# Patient Record
Sex: MALE | Employment: OTHER | URBAN - METROPOLITAN AREA
[De-identification: names, ages, dates, MRNs, and addresses within clinical notes are randomized per-mention and may not be internally consistent; named-entity substitution may affect disease eponyms.]

---

## 2020-09-17 ENCOUNTER — ANESTHESIA EVENT (OUTPATIENT)
Dept: PERIOP | Facility: HOSPITAL | Age: 85
End: 2020-09-17
Payer: COMMERCIAL

## 2020-09-17 RX ORDER — ASCORBIC ACID 500 MG
500 TABLET ORAL DAILY
COMMUNITY

## 2020-09-18 ENCOUNTER — HOSPITAL ENCOUNTER (OUTPATIENT)
Facility: HOSPITAL | Age: 85
Setting detail: OUTPATIENT SURGERY
Discharge: HOME | End: 2020-09-19
Attending: ORTHOPAEDIC SURGERY | Admitting: ORTHOPAEDIC SURGERY
Payer: COMMERCIAL

## 2020-09-18 ENCOUNTER — ANESTHESIA (OUTPATIENT)
Dept: PERIOP | Facility: HOSPITAL | Age: 85
End: 2020-09-18
Payer: COMMERCIAL

## 2020-09-18 ENCOUNTER — HOSPITAL ENCOUNTER (OUTPATIENT)
Dept: RADIOLOGY | Facility: HOSPITAL | Age: 85
Setting detail: OUTPATIENT SURGERY
Discharge: HOME | End: 2020-09-18
Payer: COMMERCIAL

## 2020-09-18 ENCOUNTER — APPOINTMENT (OUTPATIENT)
Dept: RADIOLOGY | Facility: HOSPITAL | Age: 85
End: 2020-09-18
Payer: COMMERCIAL

## 2020-09-18 VITALS — HEART RATE: 86 BPM

## 2020-09-18 DIAGNOSIS — S42.321A CLOSED DISPLACED TRANSVERSE FRACTURE OF SHAFT OF RIGHT HUMERUS, INITIAL ENCOUNTER: Primary | ICD-10-CM

## 2020-09-18 DIAGNOSIS — S42.221A 2-PART DISPLACED FRACTURE OF SURGICAL NECK OF RIGHT HUMERUS, INITIAL ENCOUNTER FOR CLOSED FRACTURE: ICD-10-CM

## 2020-09-18 LAB
GLUCOSE SERPL-MCNC: 192 MG/DL (ref 65–140)
GLUCOSE SERPL-MCNC: 245 MG/DL (ref 65–140)
GLUCOSE SERPL-MCNC: 276 MG/DL (ref 65–140)
GLUCOSE SERPL-MCNC: 292 MG/DL (ref 65–140)

## 2020-09-18 PROCEDURE — C1713 ANCHOR/SCREW BN/BN,TIS/BN: HCPCS | Performed by: ORTHOPAEDIC SURGERY

## 2020-09-18 PROCEDURE — 82948 REAGENT STRIP/BLOOD GLUCOSE: CPT

## 2020-09-18 PROCEDURE — 73030 X-RAY EXAM OF SHOULDER: CPT

## 2020-09-18 PROCEDURE — 76000 FLUOROSCOPY <1 HR PHYS/QHP: CPT

## 2020-09-18 DEVICE — 3.5MM LOCKING SCREW SLF-TPNG W/STARDRIVE(TM) RECESS 34MM
Type: IMPLANTABLE DEVICE | Site: HUMERUS | Status: NON-FUNCTIONAL
Removed: 2020-10-16

## 2020-09-18 DEVICE — 3.5MM LOCKING SCREW SLF-TPNG W/STARDRIVE(TM) RECESS 50MM
Type: IMPLANTABLE DEVICE | Site: HUMERUS | Status: NON-FUNCTIONAL
Removed: 2020-10-16

## 2020-09-18 DEVICE — 3.5MM CORTEX SCREW SELF-TAPPING 36MM
Type: IMPLANTABLE DEVICE | Site: HUMERUS | Status: NON-FUNCTIONAL
Removed: 2020-10-16

## 2020-09-18 DEVICE — 3.5MM LOCKING SCREW SLF-TPNG WITH STARDRIVE RECESS 44MM
Type: IMPLANTABLE DEVICE | Site: HUMERUS | Status: NON-FUNCTIONAL
Removed: 2020-10-16

## 2020-09-18 DEVICE — 3.5MM LOCKING SCREW SLF-TPNG W/STARDRIVE RECESS 48MM
Type: IMPLANTABLE DEVICE | Site: HUMERUS | Status: NON-FUNCTIONAL
Removed: 2020-10-16

## 2020-09-18 DEVICE — GRAFT BONE PUTTY 10ML: Type: IMPLANTABLE DEVICE | Site: HUMERUS | Status: FUNCTIONAL

## 2020-09-18 DEVICE — 3.5MM LOCKING SCREW SLF-TPNG W/STARDRIVE(TM) RECESS 36MM
Type: IMPLANTABLE DEVICE | Site: HUMERUS | Status: NON-FUNCTIONAL
Removed: 2020-10-16

## 2020-09-18 DEVICE — 3.5MM LCP® PROXIMAL HUMERUS PLATE-STANDARD 5H SHAFT/114MM
Type: IMPLANTABLE DEVICE | Site: HUMERUS | Status: NON-FUNCTIONAL
Brand: LCP
Removed: 2020-10-16

## 2020-09-18 RX ORDER — CEFAZOLIN SODIUM 1 G/50ML
1000 SOLUTION INTRAVENOUS ONCE
Status: COMPLETED | OUTPATIENT
Start: 2020-09-18 | End: 2020-09-18

## 2020-09-18 RX ORDER — ASPIRIN 325 MG
325 TABLET ORAL DAILY
Status: DISCONTINUED | OUTPATIENT
Start: 2020-09-18 | End: 2020-09-19 | Stop reason: HOSPADM

## 2020-09-18 RX ORDER — MAGNESIUM HYDROXIDE 1200 MG/15ML
LIQUID ORAL AS NEEDED
Status: DISCONTINUED | OUTPATIENT
Start: 2020-09-18 | End: 2020-09-18 | Stop reason: HOSPADM

## 2020-09-18 RX ORDER — FENTANYL CITRATE 50 UG/ML
INJECTION, SOLUTION INTRAMUSCULAR; INTRAVENOUS AS NEEDED
Status: DISCONTINUED | OUTPATIENT
Start: 2020-09-18 | End: 2020-09-18

## 2020-09-18 RX ORDER — ONDANSETRON 2 MG/ML
INJECTION INTRAMUSCULAR; INTRAVENOUS AS NEEDED
Status: DISCONTINUED | OUTPATIENT
Start: 2020-09-18 | End: 2020-09-18

## 2020-09-18 RX ORDER — BUPIVACAINE HYDROCHLORIDE 5 MG/ML
INJECTION, SOLUTION PERINEURAL
Status: COMPLETED | OUTPATIENT
Start: 2020-09-18 | End: 2020-09-18

## 2020-09-18 RX ORDER — DEXAMETHASONE SODIUM PHOSPHATE 4 MG/ML
INJECTION, SOLUTION INTRA-ARTICULAR; INTRALESIONAL; INTRAMUSCULAR; INTRAVENOUS; SOFT TISSUE AS NEEDED
Status: DISCONTINUED | OUTPATIENT
Start: 2020-09-18 | End: 2020-09-18

## 2020-09-18 RX ORDER — ACETAMINOPHEN 325 MG/1
650 TABLET ORAL EVERY 6 HOURS PRN
Status: DISCONTINUED | OUTPATIENT
Start: 2020-09-18 | End: 2020-09-19 | Stop reason: HOSPADM

## 2020-09-18 RX ORDER — CALCIUM CARBONATE 200(500)MG
1000 TABLET,CHEWABLE ORAL DAILY PRN
Status: DISCONTINUED | OUTPATIENT
Start: 2020-09-18 | End: 2020-09-19 | Stop reason: HOSPADM

## 2020-09-18 RX ORDER — INSULIN GLARGINE 100 [IU]/ML
4 INJECTION, SOLUTION SUBCUTANEOUS EVERY 12 HOURS SCHEDULED
Status: DISCONTINUED | OUTPATIENT
Start: 2020-09-18 | End: 2020-09-19 | Stop reason: HOSPADM

## 2020-09-18 RX ORDER — PROPOFOL 10 MG/ML
INJECTION, EMULSION INTRAVENOUS AS NEEDED
Status: DISCONTINUED | OUTPATIENT
Start: 2020-09-18 | End: 2020-09-18

## 2020-09-18 RX ORDER — DOCUSATE SODIUM 100 MG/1
100 CAPSULE, LIQUID FILLED ORAL 2 TIMES DAILY
Status: DISCONTINUED | OUTPATIENT
Start: 2020-09-18 | End: 2020-09-19 | Stop reason: HOSPADM

## 2020-09-18 RX ORDER — SODIUM CHLORIDE 9 MG/ML
125 INJECTION, SOLUTION INTRAVENOUS CONTINUOUS
Status: DISCONTINUED | OUTPATIENT
Start: 2020-09-18 | End: 2020-09-18

## 2020-09-18 RX ORDER — SENNOSIDES 8.6 MG
1 TABLET ORAL DAILY
Status: DISCONTINUED | OUTPATIENT
Start: 2020-09-18 | End: 2020-09-19 | Stop reason: HOSPADM

## 2020-09-18 RX ORDER — SODIUM CHLORIDE 9 MG/ML
75 INJECTION, SOLUTION INTRAVENOUS CONTINUOUS
Status: DISCONTINUED | OUTPATIENT
Start: 2020-09-18 | End: 2020-09-19 | Stop reason: HOSPADM

## 2020-09-18 RX ORDER — GLYCOPYRROLATE 0.2 MG/ML
INJECTION INTRAMUSCULAR; INTRAVENOUS AS NEEDED
Status: DISCONTINUED | OUTPATIENT
Start: 2020-09-18 | End: 2020-09-18

## 2020-09-18 RX ORDER — NEOSTIGMINE METHYLSULFATE 1 MG/ML
INJECTION INTRAVENOUS AS NEEDED
Status: DISCONTINUED | OUTPATIENT
Start: 2020-09-18 | End: 2020-09-18

## 2020-09-18 RX ORDER — EPHEDRINE SULFATE 50 MG/ML
INJECTION INTRAVENOUS AS NEEDED
Status: DISCONTINUED | OUTPATIENT
Start: 2020-09-18 | End: 2020-09-18

## 2020-09-18 RX ORDER — ONDANSETRON 2 MG/ML
4 INJECTION INTRAMUSCULAR; INTRAVENOUS EVERY 6 HOURS PRN
Status: DISCONTINUED | OUTPATIENT
Start: 2020-09-18 | End: 2020-09-19 | Stop reason: HOSPADM

## 2020-09-18 RX ORDER — MIDAZOLAM HYDROCHLORIDE 2 MG/2ML
INJECTION, SOLUTION INTRAMUSCULAR; INTRAVENOUS AS NEEDED
Status: DISCONTINUED | OUTPATIENT
Start: 2020-09-18 | End: 2020-09-18

## 2020-09-18 RX ORDER — ROCURONIUM BROMIDE 10 MG/ML
INJECTION, SOLUTION INTRAVENOUS AS NEEDED
Status: DISCONTINUED | OUTPATIENT
Start: 2020-09-18 | End: 2020-09-18

## 2020-09-18 RX ORDER — LIDOCAINE HYDROCHLORIDE 20 MG/ML
INJECTION, SOLUTION EPIDURAL; INFILTRATION; INTRACAUDAL; PERINEURAL AS NEEDED
Status: DISCONTINUED | OUTPATIENT
Start: 2020-09-18 | End: 2020-09-18

## 2020-09-18 RX ORDER — FENTANYL CITRATE/PF 50 MCG/ML
25 SYRINGE (ML) INJECTION
Status: DISCONTINUED | OUTPATIENT
Start: 2020-09-18 | End: 2020-09-18 | Stop reason: HOSPADM

## 2020-09-18 RX ORDER — OXYCODONE HYDROCHLORIDE 5 MG/1
2.5 TABLET ORAL EVERY 4 HOURS PRN
Status: DISCONTINUED | OUTPATIENT
Start: 2020-09-18 | End: 2020-09-19 | Stop reason: HOSPADM

## 2020-09-18 RX ORDER — CEFAZOLIN SODIUM 1 G/50ML
1000 SOLUTION INTRAVENOUS EVERY 8 HOURS
Status: COMPLETED | OUTPATIENT
Start: 2020-09-18 | End: 2020-09-19

## 2020-09-18 RX ORDER — ONDANSETRON 2 MG/ML
4 INJECTION INTRAMUSCULAR; INTRAVENOUS ONCE AS NEEDED
Status: DISCONTINUED | OUTPATIENT
Start: 2020-09-18 | End: 2020-09-18 | Stop reason: HOSPADM

## 2020-09-18 RX ADMIN — NEOSTIGMINE METHYLSULFATE 3 MG: 1 INJECTION, SOLUTION INTRAVENOUS at 11:05

## 2020-09-18 RX ADMIN — INSULIN GLARGINE 4 UNITS: 100 INJECTION, SOLUTION SUBCUTANEOUS at 15:56

## 2020-09-18 RX ADMIN — EPHEDRINE SULFATE 10 MG: 50 INJECTION, SOLUTION INTRAVENOUS at 09:47

## 2020-09-18 RX ADMIN — ACETAMINOPHEN 650 MG: 325 TABLET ORAL at 12:59

## 2020-09-18 RX ADMIN — DOCUSATE SODIUM 100 MG: 100 CAPSULE, LIQUID FILLED ORAL at 18:22

## 2020-09-18 RX ADMIN — EPHEDRINE SULFATE 10 MG: 50 INJECTION, SOLUTION INTRAVENOUS at 09:45

## 2020-09-18 RX ADMIN — SODIUM CHLORIDE: 0.9 INJECTION, SOLUTION INTRAVENOUS at 11:04

## 2020-09-18 RX ADMIN — FENTANYL CITRATE 100 MCG: 50 INJECTION, SOLUTION INTRAMUSCULAR; INTRAVENOUS at 09:22

## 2020-09-18 RX ADMIN — GLYCOPYRROLATE 0.6 MG: 0.2 INJECTION, SOLUTION INTRAMUSCULAR; INTRAVENOUS at 11:05

## 2020-09-18 RX ADMIN — EPHEDRINE SULFATE 10 MG: 50 INJECTION, SOLUTION INTRAVENOUS at 09:51

## 2020-09-18 RX ADMIN — DEXAMETHASONE SODIUM PHOSPHATE 4 MG: 4 INJECTION, SOLUTION INTRAMUSCULAR; INTRAVENOUS at 09:46

## 2020-09-18 RX ADMIN — LIDOCAINE HYDROCHLORIDE 100 MG: 20 INJECTION, SOLUTION EPIDURAL; INFILTRATION; INTRACAUDAL; PERINEURAL at 09:34

## 2020-09-18 RX ADMIN — ASPIRIN 325 MG ORAL TABLET 325 MG: 325 PILL ORAL at 18:22

## 2020-09-18 RX ADMIN — SODIUM CHLORIDE: 0.9 INJECTION, SOLUTION INTRAVENOUS at 10:19

## 2020-09-18 RX ADMIN — CEFAZOLIN SODIUM 1000 MG: 1 SOLUTION INTRAVENOUS at 18:22

## 2020-09-18 RX ADMIN — INSULIN LISPRO 5 UNITS: 100 INJECTION, SOLUTION INTRAVENOUS; SUBCUTANEOUS at 18:48

## 2020-09-18 RX ADMIN — MIDAZOLAM 2 MG: 1 INJECTION INTRAMUSCULAR; INTRAVENOUS at 09:22

## 2020-09-18 RX ADMIN — ONDANSETRON 4 MG: 2 INJECTION INTRAMUSCULAR; INTRAVENOUS at 16:28

## 2020-09-18 RX ADMIN — SODIUM CHLORIDE 125 ML/HR: 0.9 INJECTION, SOLUTION INTRAVENOUS at 08:46

## 2020-09-18 RX ADMIN — BUPIVACAINE HYDROCHLORIDE 20 ML: 5 INJECTION, SOLUTION PERINEURAL at 09:25

## 2020-09-18 RX ADMIN — INSULIN LISPRO 4 UNITS: 100 INJECTION, SOLUTION INTRAVENOUS; SUBCUTANEOUS at 14:28

## 2020-09-18 RX ADMIN — INSULIN GLARGINE 4 UNITS: 100 INJECTION, SOLUTION SUBCUTANEOUS at 21:23

## 2020-09-18 RX ADMIN — ROCURONIUM BROMIDE 50 MG: 10 INJECTION, SOLUTION INTRAVENOUS at 09:34

## 2020-09-18 RX ADMIN — SENNOSIDES 8.6 MG: 8.6 TABLET, FILM COATED ORAL at 12:59

## 2020-09-18 RX ADMIN — SODIUM CHLORIDE 75 ML/HR: 0.9 INJECTION, SOLUTION INTRAVENOUS at 12:27

## 2020-09-18 RX ADMIN — ONDANSETRON 4 MG: 2 INJECTION INTRAMUSCULAR; INTRAVENOUS at 09:46

## 2020-09-18 RX ADMIN — DOCUSATE SODIUM 100 MG: 100 CAPSULE, LIQUID FILLED ORAL at 13:00

## 2020-09-18 RX ADMIN — CEFAZOLIN SODIUM 1000 MG: 1 SOLUTION INTRAVENOUS at 09:15

## 2020-09-18 RX ADMIN — PROPOFOL 200 MG: 10 INJECTION, EMULSION INTRAVENOUS at 09:34

## 2020-09-18 RX ADMIN — FENTANYL CITRATE 100 MCG: 50 INJECTION, SOLUTION INTRAMUSCULAR; INTRAVENOUS at 09:34

## 2020-09-18 NOTE — DISCHARGE INSTRUCTIONS
[facility] Operative Instructions  MARYCARMEN Alfaro  Office Phone: [phone] (All Prescribed Medications Were E-Scribed to pharmacy on file)  ¨ Blood Thinners  ¨Yes Enteric Coated Aspirin 325 mg: Take twice daily for 21 days  ¨No Plavix: Patients on Plavix will resume their standard dose while in the hospital    ¨No Xarelto: Take once daily with dinner for 2 weeks  ¨No Coumadin: If we have started you on this for blood thinning, you will be discharged from the hospital with instructions how much coumadin to take daily until your next blood draw  You will be tested on Mondays and Thursdays  The home health nurse will draw your blood  Our office will select the dosing instructions for you until your next blood draw  If you were on the medication before the surgery, the physician who manages your Coumadin will resume the care of that medication  On the day you have your blood test drawn, do not take your Coumadin dose until contacted by the office  If you have not heard by 4 P  M , please take the dose you took the day before and call the managing doctor's office for instructions in the morning  ¨ Muscle Relaxer:   ¨No Methocarbamol 750 mg: Take 1 tablet every 8 hours as needed  ¨No Cyclobenzaprine 5 mg: Take 1 tablet every 8 hours as needed  ¨ Pain Medications:   ¨No Dilaudid 2 mg: Take 1 tablet every 4 hours as needed for pain for the first 3 days  Once completed start Oxycodone 5 mg  ¨Yes Oxycodone 5 mg: Take 1 tablets every 4 hours as needed for pain  Please follow the direction on the label  Do not drive while taking pain medication  Do not take pain medication on an empty stomach  This may make you nauseated  Your prescriptions may run out before you return for your next visit  Please give us two regular office day's notice before you run out, to prevent any problems of not having pain medicine  Please refrain from using alcohol with your pain medicines   You may also include Tylenol for pain  You should not exceed 3000 mg of Tylenol in a day  Please be careful to not overdose the Tylenol  ¨ Arthritis/Anti-inflammatory: If you were taking an anti inflammatory medicine prior to your surgery, please wait until you have stopped the blood thinning medicine to resume taking it  ¨ Senokot: This is our recommended stool softener  Please use this medication to help prevent constipation that can arise from iron and pain medication use  It is also advisable to increase your fluid intake and fiber in your diet during your stoney and pain medication therapy  ¨ Herbal Medicines: Please hold all these preparations until after your first post-operative visit  ACTIVITY  ¨ Your sling should be worn at all times except during: Hygiene activities (showering and getting dressed) and Exercises activities  ¨ Continue to wear sling until you are seen by your physician  ¨ If at all possible, have someone with you to help you at all times  ¨ You may participate in activity as tolerated  It is likely that you will tire more easily for a time after surgery  Please rest when you need it to help your healing process  ¨ When you are back at home you will likely have physical therapy three times a week  On the days you do not have formal therapy please perform the home exercises you have learned or were given to you  ¨ Immediately following the surgery you are not permitted to drive until cleared by your surgeon  ¨ Please do not perform lifting tasks or strenuous domestic activities  ¨ If you currently are employed, you are off work until cleared by your surgeon  Everyone's ability to return to work is determined by his or her abilities  Your return to work may take a few weeks to a few months  ¨ Sexual activities are permitted as tolerated  A precautionary guide has been given to you before surgery  If you lost it we will gladly supply you with another      NORMAL FINDINGS   Numbness along the incision    Swelling and black and blues throughout the operative arm   Your incision area will feel warm  WOUND CARE  ¨ If you have a clear plastic (Tegaderm) dressing, you should maintain it for 5-7 days post operatively then remove it, or it will be removed by the home nurses  It is ok to leave it on until follow up with your physician  ¨ It is not uncommon for some reddish fluid to accumulate under the tegaderm   This is no cause for alarm  ¨ It is OK to shower with the tegaderm dressing on  Please do not soak or submerge the incision into a pool, bath or hot tub until after your 1st post-operative visit  ¨ If you have narrow white tape strips over the incision (Steri-Strips), Do not remove them, allow them to fall off  ¨ It is OK to shower with Steri Strips  Showers should be quick, Hand wash with soap, pat dry with a towel  Do not scrub, soak or submerge incision  ¨ You may leave the incision open to the air after removing your dressing  ¨ If there is a Mesh Dressing over the incision (Prineo), you should maintain it for 10-14 days post operatively then remove it, or it will be removed by the home nurses  It is ok to leave it on until follow up with your physician if that visit is within 14 days post operatively  ¨ Please be generous with the use of ice  It is a very good remedy  Apply ice for only twenty minutes at a time  You may use it every hour  It is recommended to ice before and after anticipated activities, which includes exercise and physical therapy  FOLLOW UP  ¨ You should have a scheduled visit with your surgeon scheduled for two to four weeks after surgery  If you do not remember your appointment date and time, or if you are sure you do not have one, please call to set one up  If you have any questions or concerns before then, do not hesitate to call    ¨ Please inform the office if you experience one of the following:   Fever that is not responding to Tylenol and is above 101 degrees    Redness of the skin that is increasing in area    Your incision is soaking the dressings with drainage or blood    You're unable to bear weight on your operative leg or legs

## 2020-09-18 NOTE — PLAN OF CARE
Problem: Potential for Falls  Goal: Patient will remain free of falls  Description: INTERVENTIONS:  - Assess patient frequently for physical needs  -  Identify cognitive and physical deficits and behaviors that affect risk of falls  -  [place] fall precautions as indicated by assessment   - Educate patient/family on patient safety including physical limitations  - Instruct patient to call for assistance with activity based on assessment  - Modify environment to reduce risk of injury  - Consider OT/PT consult to assist with strengthening/mobility  Outcome: Progressing     Problem: PAIN - ADULT  Goal: Verbalizes/displays adequate comfort level or baseline comfort level  Description: Interventions:  - Encourage patient to monitor pain and request assistance  - Assess pain using appropriate pain scale  - Administer analgesics based on type and severity of pain and evaluate response  - Implement non-pharmacological measures as appropriate and evaluate response  - Consider cultural and social influences on pain and pain management  - Notify physician/advanced practitioner if interventions unsuccessful or patient reports new pain  Outcome: Progressing     Problem: SAFETY ADULT  Goal: Patient will remain free of falls  Description: INTERVENTIONS:  - Assess patient frequently for physical needs  -  Identify cognitive and physical deficits and behaviors that affect risk of falls    -  [place] fall precautions as indicated by assessment   - Educate patient/family on patient safety including physical limitations  - Instruct patient to call for assistance with activity based on assessment  - Modify environment to reduce risk of injury  - Consider OT/PT consult to assist with strengthening/mobility  Outcome: Progressing  Goal: Maintain or return to baseline ADL function  Description: INTERVENTIONS:  -  Assess patient's ability to carry out ADLs; assess patient's baseline for ADL function and identify physical deficits which impact ability to perform ADLs (bathing, care of mouth/teeth, toileting, grooming, dressing, etc )  - Assess/evaluate cause of self-care deficits   - Assess range of motion  - Assess patient's mobility; develop plan if impaired  - Assess patient's need for assistive devices and provide as appropriate  - Encourage maximum independence but intervene and supervise when necessary  - Involve family in performance of ADLs  - Assess for home care needs following discharge   - Consider OT consult to assist with ADL evaluation and planning for discharge  - Provide patient education as appropriate  Outcome: Progressing  Goal: Maintain or return mobility status to optimal level  Description: INTERVENTIONS:  - Assess patient's baseline mobility status (ambulation, transfers, stairs, etc )    - Identify cognitive and physical deficits and behaviors that affect mobility  - Identify mobility aids required to assist with transfers and/or ambulation (gait belt, sit-to-stand, lift, walker, cane, etc )  - [place] fall precautions as indicated by assessment  - Record patient progress and toleration of activity level on Mobility SBAR; progress patient to next Phase/Stage  - Instruct patient to call for assistance with activity based on assessment  - Consider rehabilitation consult to assist with strengthening/weightbearing, etc   Outcome: Progressing     Problem: DISCHARGE PLANNING  Goal: Discharge to home or other facility with appropriate resources  Description: INTERVENTIONS:  - Identify barriers to discharge w/patient and caregiver  - Arrange for needed discharge resources and transportation as appropriate  - Identify discharge learning needs (meds, wound care, etc )  - Arrange for interpretive services to assist at discharge as needed  - Refer to Case Management Department for coordinating discharge planning if the patient needs post-hospital services based on physician/advanced practitioner order or complex needs related to functional status, cognitive ability, or social support system  Outcome: Progressing     Problem: Knowledge Deficit  Goal: Patient/family/caregiver demonstrates understanding of disease process, treatment plan, medications, and discharge instructions  Description: Complete learning assessment and assess knowledge base    Interventions:  - Provide teaching at level of understanding  - Provide teaching via preferred learning methods  Outcome: Progressing

## 2020-09-18 NOTE — ANESTHESIA PREPROCEDURE EVALUATION
Procedure:  O R I F  HUMERUS W/BONE GRAFTING (Right Arm Upper)    Relevant Problems   CARDIO   (+) LBBB (left bundle branch block)      Other   (+) Diabetes mellitus (HCC)   (+) Fracture of right humerus        Physical Exam    Airway    Mallampati score: II  TM Distance: >3 FB  Neck ROM: full     Dental   lower dentures,     Cardiovascular  Rhythm: regular, Rate: normal, Cardiovascular exam normal    Pulmonary  Pulmonary exam normal Breath sounds clear to auscultation,     Other Findings        Anesthesia Plan  ASA Score- 2     Anesthesia Type- regional and general with ASA Monitors  Additional Monitors:   Airway Plan:     Comment: Daughter expressed concern re: GA and advanced age          Plan Factors-    EKG reviewed  Patient is not a current smoker  Patient instructed to abstain from smoking on day of procedure  Patient did not smoke on day of surgery  Induction- intravenous  Postoperative Plan- Plan for postoperative opioid use  Informed Consent- Anesthetic plan and risks discussed with patient and daughter

## 2020-09-18 NOTE — OP NOTE
OPERATIVE REPORT  PATIENT NAME: Alisha Hameed    :  1928  MRN: [id number]  Pt Location: [facility] OR ROOM 02    SURGERY DATE: 2020    Surgeon(s) and Role:     * Zula Skiff, MD - Primary     * Randi Boudreaux PA-C - Assisting    Preop Diagnosis:  2-part displaced fracture of surgical neck of right humerus, initial encounter for closed fracture [S42 221A]    Post-Op Diagnosis Codes:     * 2-part displaced fracture of surgical neck of right humerus, initial encounter for closed fracture [S42 221A]    Procedure(s) (LRB):  O R I F  HUMERUS W/BONE GRAFTING (Right)    Specimen(s):  * No specimens in log *    Estimated Blood Loss:   200 cc    Drains:  * No LDAs found *    Anesthesia Type:   General w/ Regional    Operative Indications:  2-part displaced fracture of surgical neck of right humerus, initial encounter for closed fracture [S42 221A]  Atrophic Nonunion of proximal humerus fracture    Operative Findings:  Atrophic nonunion of proximal humerus fracture    Complications:   None    Procedure and Technique:  Patient was seen and evaluated in the preop holding area  Identified the right arm as the operative site, he confirmed the right arm as the site, and marked the right shoulder with my initials  A scalene block performed by Anesthesia  Once the OR, he was placed under general anesthesia and general endotracheal tube was placed  He was positioned in a modified beach chair position using a standard OR bed  We made sure there was enough room for the C-arm to be utilized  The right arm and shoulder were prepped and draped in usual orthopedic fashion  Time-out correctly engaged  An extended incision was then made along the anterior aspect of the arm  This was carried down to the deltopectoral was identified  The interval was identified and carried distally  It was then extended proximally only  We then extended the incision distally  We then identified the nonunion    We were able to remove significant fibrous tissue to allow us to view the ends of the fracture  Using a curette, we reestablished the intramedullary canals  We then continued to free up the bones  We removed any abnormal bone and were able to perform the reduction  We initially placed a plate and under fluoroscopy found that it was too anterior we then lateralized the plate and were able to get a better reduction  We placed several screws proximally and several screws distally  We once again checked under fluoroscopy that the reduction was adequate  We then placed a total of 6 screws in the proximal head  We placed 5 screws distally  We used a combination of 1 cortical screw and 5 locking screws distally  All the screws in the humeral head were locking  Final fluoroscopy demonstrated excellent reduction  We then thoroughly irrigated the area with pulse lavage  A 10 cc of bone graft was then placed about the fracture  The deep soft tissues were closed with a running Vicryl #1  The skin was closed with 2 O Vicryl interrupted sutures  The skin closure then performed  The sterile dressing was applied  Patient placed into a sling  The patient is of advanced age, 80years old, has multiple medical conditions, has a history of falls, and is undergoing a significant shoulder surgery which necessitates an inpatient admission  He will need to be monitored from a medical standpoint following surgery  He would not be safe for him to return home following the surgery in a same-day fashion     A physician assistant was required during the procedure for retraction tissue handling,dissection and suturing    Patient Disposition:  extubated and stable    SIGNATURE: Kai Almaraz MD  DATE: September 18, 2020  TIME: 11:36 AM

## 2020-09-18 NOTE — ANESTHESIA PROCEDURE NOTES
Peripheral Block    Patient location during procedure: holding area  Start time: 9/18/2020 9:21 AM  Reason for block: at surgeon's request and post-op pain management  Staffing  Anesthesiologist: Terry Perrin DO  Performed: anesthesiologist   Preanesthetic Checklist  Completed: patient identified, site marked, surgical consent, pre-op evaluation, timeout performed, IV checked, risks and benefits discussed and monitors and equipment checked  Peripheral Block  Patient position: supine  Prep: ChloraPrep  Patient monitoring: heart rate, cardiac monitor, continuous pulse ox and frequent blood pressure checks  Block type: interscalene  Laterality: right  Injection technique: single-shot  Procedures: ultrasound guided, Ultrasound guidance required for the procedure to increase accuracy and safety of medication placement and decrease risk of complications   and nerve stimulator  Ultrasound permanent image savedbupivacaine (MARCAINE) 0 5 % perineural infiltration, 20 mL  Needle  Needle type: Stimuplex   Needle gauge: 22 G  Needle length: 5 cm  Needle localization: ultrasound guidance and nerve stimulator  Assessment  Injection assessment: incremental injection, local visualized surrounding nerve on ultrasound, negative aspiration for heme and no paresthesia on injection  Paresthesia pain: none  Heart rate change: no  Slow fractionated injection: yes  Post-procedure:  site cleaned  patient tolerated the procedure well with no immediate complications  Additional Notes  Block done with SRNA Merary Gable

## 2020-09-19 VITALS
DIASTOLIC BLOOD PRESSURE: 50 MMHG | OXYGEN SATURATION: 97 % | WEIGHT: 162 LBS | BODY MASS INDEX: 23.19 KG/M2 | RESPIRATION RATE: 18 BRPM | TEMPERATURE: 97.6 F | HEART RATE: 64 BPM | SYSTOLIC BLOOD PRESSURE: 136 MMHG | HEIGHT: 70 IN

## 2020-09-19 PROBLEM — N18.30 CKD STAGE 3 SECONDARY TO DIABETES (HCC): Status: ACTIVE | Noted: 2020-09-19

## 2020-09-19 PROBLEM — E11.22 CKD STAGE 3 SECONDARY TO DIABETES (HCC): Status: ACTIVE | Noted: 2020-09-19

## 2020-09-19 LAB
ANION GAP SERPL CALCULATED.3IONS-SCNC: 7 MMOL/L (ref 4–13)
BUN SERPL-MCNC: 32 MG/DL (ref 5–25)
CALCIUM SERPL-MCNC: 7.1 MG/DL (ref 8.3–10.1)
CHLORIDE SERPL-SCNC: 105 MMOL/L (ref 100–108)
CO2 SERPL-SCNC: 24 MMOL/L (ref 21–32)
CREAT SERPL-MCNC: 1.76 MG/DL (ref 0.6–1.3)
GFR SERPL CREATININE-BSD FRML MDRD: 33 ML/MIN/1.73SQ M
GLUCOSE P FAST SERPL-MCNC: 254 MG/DL (ref 65–99)
GLUCOSE SERPL-MCNC: 180 MG/DL (ref 65–140)
GLUCOSE SERPL-MCNC: 235 MG/DL (ref 65–140)
GLUCOSE SERPL-MCNC: 240 MG/DL (ref 65–140)
GLUCOSE SERPL-MCNC: 254 MG/DL (ref 65–140)
GLUCOSE SERPL-MCNC: 257 MG/DL (ref 65–140)
POTASSIUM SERPL-SCNC: 4.6 MMOL/L (ref 3.5–5.3)
SODIUM SERPL-SCNC: 136 MMOL/L (ref 136–145)

## 2020-09-19 PROCEDURE — 80048 BASIC METABOLIC PNL TOTAL CA: CPT | Performed by: ORTHOPAEDIC SURGERY

## 2020-09-19 PROCEDURE — 82948 REAGENT STRIP/BLOOD GLUCOSE: CPT

## 2020-09-19 PROCEDURE — 97166 OT EVAL MOD COMPLEX 45 MIN: CPT

## 2020-09-19 PROCEDURE — 99222 1ST HOSP IP/OBS MODERATE 55: CPT | Performed by: INTERNAL MEDICINE

## 2020-09-19 PROCEDURE — 97163 PT EVAL HIGH COMPLEX 45 MIN: CPT

## 2020-09-19 RX ORDER — SODIUM CHLORIDE 9 MG/ML
250 INJECTION, SOLUTION INTRAVENOUS CONTINUOUS
Status: DISPENSED | OUTPATIENT
Start: 2020-09-19 | End: 2020-09-19

## 2020-09-19 RX ORDER — OXYCODONE HYDROCHLORIDE 5 MG/1
5 TABLET ORAL EVERY 4 HOURS PRN
Qty: 30 TABLET | Refills: 0
Start: 2020-09-19 | End: 2020-09-29

## 2020-09-19 RX ORDER — ASPIRIN 325 MG
325 TABLET ORAL 2 TIMES DAILY
Refills: 0 | COMMUNITY
Start: 2020-09-19 | End: 2020-10-19 | Stop reason: HOSPADM

## 2020-09-19 RX ADMIN — OXYCODONE HYDROCHLORIDE 2.5 MG: 5 TABLET ORAL at 02:40

## 2020-09-19 RX ADMIN — CEFAZOLIN SODIUM 1000 MG: 1 SOLUTION INTRAVENOUS at 01:20

## 2020-09-19 RX ADMIN — CALCIUM CARBONATE (ANTACID) CHEW TAB 500 MG 1000 MG: 500 CHEW TAB at 17:16

## 2020-09-19 RX ADMIN — INSULIN LISPRO 6 UNITS: 100 INJECTION, SOLUTION INTRAVENOUS; SUBCUTANEOUS at 08:19

## 2020-09-19 RX ADMIN — ASPIRIN 325 MG ORAL TABLET 325 MG: 325 PILL ORAL at 08:19

## 2020-09-19 RX ADMIN — DOCUSATE SODIUM 100 MG: 100 CAPSULE, LIQUID FILLED ORAL at 17:18

## 2020-09-19 RX ADMIN — SODIUM CHLORIDE 250 ML/HR: 0.9 INJECTION, SOLUTION INTRAVENOUS at 14:12

## 2020-09-19 RX ADMIN — SENNOSIDES 8.6 MG: 8.6 TABLET, FILM COATED ORAL at 08:19

## 2020-09-19 RX ADMIN — INSULIN LISPRO 4 UNITS: 100 INJECTION, SOLUTION INTRAVENOUS; SUBCUTANEOUS at 11:43

## 2020-09-19 RX ADMIN — INSULIN LISPRO 4 UNITS: 100 INJECTION, SOLUTION INTRAVENOUS; SUBCUTANEOUS at 11:44

## 2020-09-19 RX ADMIN — ACETAMINOPHEN 650 MG: 325 TABLET ORAL at 08:19

## 2020-09-19 RX ADMIN — INSULIN LISPRO 5 UNITS: 100 INJECTION, SOLUTION INTRAVENOUS; SUBCUTANEOUS at 15:57

## 2020-09-19 RX ADMIN — DOCUSATE SODIUM 100 MG: 100 CAPSULE, LIQUID FILLED ORAL at 08:19

## 2020-09-19 RX ADMIN — INSULIN GLARGINE 4 UNITS: 100 INJECTION, SOLUTION SUBCUTANEOUS at 08:19

## 2020-09-19 NOTE — SOCIAL WORK
CM covering over the weekend  Consult for possible home health aids  CM met with pt and daughter at bedside  Kaveh Villalobos (Daughter)  [phone]   Pt lives in a 2 story home  He lives alone but has a friend staying with him right now while he is recovering from surgery  Daughter reports she comes from her home in the [place] twice per week to help him around the house  He has a house keeper  Pt has a cane and walker and home but generally does not use  He walks up and down the steps independently  PT is recommending OP PT  Explained HHA services to pt and daughter, both feel this is not needed  Daughter will transport pt home later today

## 2020-09-19 NOTE — PROGRESS NOTES
MD in to talk to patient  Patients daughter will be staying with patient this evening and then he has a caregiver that stays with him  Patient told to change positions slowly and to sit at the side of the bed/chair for a few minutes until he gets his bearings  He was also reminded to stand up slowly  Verbalized understanding  No changes in discharge orders  IV removed and patient discharged home at this time

## 2020-09-19 NOTE — PROGRESS NOTES
Progress Note - Orthopedics  Agustin Edwards  Patients MRN: [id number]   Date: 9/19/2020    Postop Check - POD 1 - Right humerus ORIF          Assessment/Plan     Past Medical History:   Diagnosis Date    Diabetes mellitus ([address] )     IDDM    Fracture of right humerus     8/25/2019    told to manage medically for OR  repair today  9/18/2020   [facility] (hard of hearing)     has 1 hearing aid    LBBB (left bundle branch block)     Rash      right arm below elbow-chronic intermittent - daughter believes from artificial sweeteners-reported to surgeons office 9/17     Wears dentures     full uppers  partial lower    Wears glasses         Postop Right humerus fracture ORIF  Pain controlled with current medications  No adjustments needed  Continue current treatment - discharge to home today  Subjective     · Patient comfortable  · Awake and alert  · Nausea/vomiting No    Negative for chest pain and shortness of breath   Pain well controlled           Objective     · Patient awake and alert, no distress  Daughter at bedside  · Right shoulder Dressing:  clean and dry  Sling in place  · Fingers Neurovascularly intact,  strength equal bilateral, full strength with wrist extension

## 2020-09-19 NOTE — ASSESSMENT & PLAN NOTE
Patient has insulin-dependent diabetes type 2  He is on Lantus 4 units b i d    NovoLog 6 units with breakfast, 4 units with lunch and 5 units with dinner  He says he has hemoglobin A1c has been in the range between 6 3 and 7 4, last hemoglobin A1c 7 4, 3 months ago  His blood sugar has been in the range of 200s  We will switch from regular diet to carb consistent diet, and add sliding scale

## 2020-09-19 NOTE — PLAN OF CARE
Problem: Potential for Falls  Goal: Patient will remain free of falls  Description: INTERVENTIONS:  - Assess patient frequently for physical needs  -  Identify cognitive and physical deficits and behaviors that affect risk of falls  -  [place] fall precautions as indicated by assessment   - Educate patient/family on patient safety including physical limitations  - Instruct patient to call for assistance with activity based on assessment  - Modify environment to reduce risk of injury  - Consider OT/PT consult to assist with strengthening/mobility  Outcome: Progressing     Problem: PAIN - ADULT  Goal: Verbalizes/displays adequate comfort level or baseline comfort level  Description: Interventions:  - Encourage patient to monitor pain and request assistance  - Assess pain using appropriate pain scale  - Administer analgesics based on type and severity of pain and evaluate response  - Implement non-pharmacological measures as appropriate and evaluate response  - Consider cultural and social influences on pain and pain management  - Notify physician/advanced practitioner if interventions unsuccessful or patient reports new pain  Outcome: Progressing     Problem: SAFETY ADULT  Goal: Patient will remain free of falls  Description: INTERVENTIONS:  - Assess patient frequently for physical needs  -  Identify cognitive and physical deficits and behaviors that affect risk of falls    -  [place] fall precautions as indicated by assessment   - Educate patient/family on patient safety including physical limitations  - Instruct patient to call for assistance with activity based on assessment  - Modify environment to reduce risk of injury  - Consider OT/PT consult to assist with strengthening/mobility  Outcome: Progressing  Goal: Maintain or return to baseline ADL function  Description: INTERVENTIONS:  -  Assess patient's ability to carry out ADLs; assess patient's baseline for ADL function and identify physical deficits which impact ability to perform ADLs (bathing, care of mouth/teeth, toileting, grooming, dressing, etc )  - Assess/evaluate cause of self-care deficits   - Assess range of motion  - Assess patient's mobility; develop plan if impaired  - Assess patient's need for assistive devices and provide as appropriate  - Encourage maximum independence but intervene and supervise when necessary  - Involve family in performance of ADLs  - Assess for home care needs following discharge   - Consider OT consult to assist with ADL evaluation and planning for discharge  - Provide patient education as appropriate  Outcome: Progressing  Goal: Maintain or return mobility status to optimal level  Description: INTERVENTIONS:  - Assess patient's baseline mobility status (ambulation, transfers, stairs, etc )    - Identify cognitive and physical deficits and behaviors that affect mobility  - Identify mobility aids required to assist with transfers and/or ambulation (gait belt, sit-to-stand, lift, walker, cane, etc )  - [place] fall precautions as indicated by assessment  - Record patient progress and toleration of activity level on Mobility SBAR; progress patient to next Phase/Stage  - Instruct patient to call for assistance with activity based on assessment  - Consider rehabilitation consult to assist with strengthening/weightbearing, etc   Outcome: Progressing     Problem: DISCHARGE PLANNING  Goal: Discharge to home or other facility with appropriate resources  Description: INTERVENTIONS:  - Identify barriers to discharge w/patient and caregiver  - Arrange for needed discharge resources and transportation as appropriate  - Identify discharge learning needs (meds, wound care, etc )  - Arrange for interpretive services to assist at discharge as needed  - Refer to Case Management Department for coordinating discharge planning if the patient needs post-hospital services based on physician/advanced practitioner order or complex needs related to functional status, cognitive ability, or social support system  Outcome: Progressing     Problem: Knowledge Deficit  Goal: Patient/family/caregiver demonstrates understanding of disease process, treatment plan, medications, and discharge instructions  Description: Complete learning assessment and assess knowledge base    Interventions:  - Provide teaching at level of understanding  - Provide teaching via preferred learning methods  Outcome: Progressing     Problem: COPING  Goal: Pt/Family able to verbalize concerns and demonstrate effective coping strategies  Description: INTERVENTIONS:  - Assist patient/family to identify coping skills, available support systems and cultural and spiritual values  - Provide emotional support, including active listening and acknowledgement of concerns of patient and caregivers  - Reduce environmental stimuli, as able  - Provide patient education  - Assess for spiritual pain/suffering and initiate spiritual care, including notification of Pastoral Care or dasha based community as needed  - Assess effectiveness of coping strategies  Outcome: Progressing  Goal: Will report anxiety at manageable levels  Description: INTERVENTIONS:  - Administer medication as ordered  - Teach and encourage coping skills  - Provide emotional support  - Assess patient/family for anxiety and ability to cope  Outcome: Progressing

## 2020-09-19 NOTE — PLAN OF CARE
Problem: OCCUPATIONAL THERAPY ADULT  Goal: Performs self-care activities at highest level of function for planned discharge setting  See evaluation for individualized goals  Description: Treatment Interventions: ADL retraining, UE strengthening/ROM, Functional transfer training, Cognitive reorientation, Equipment evaluation/education, Patient/family training, Endurance training, Compensatory technique education, Energy conservation, Activityengagement          See flowsheet documentation for full assessment, interventions and recommendations  Note: Limitation: Decreased ADL status, Decreased UE ROM, Decreased UE strength, Decreased Safe judgement during ADL, Decreased cognition, Decreased endurance, Decreased self-care trans, Decreased high-level ADLs, Decreased sensation, Decreased fine motor control, Non-func R UE  Prognosis: Good  Assessment: Pt is a 80 y o  male seen for OT evaluation s/p admit to [facility] on 9/18/2020 w/ fall OOB 8/2019 resulting in displaced fracture of surgical neck of right humerus and conservative treatment did not improve and now, s/p ORIF humerus w/ bone grafting w/ sling in place, NWB R UE  Comorbidities affecting pt's functional performance at time of assessment include: DM I, [place]  Personal factors affecting pt at time of IE include: increased pain  Prior to admission, pt was living w/ caregiver who will provide 24/7 support/assist to pt and pt was independent w/ ADLs, independent w/ functional transfers and mobility w/ no Ad, assist w/ IADLs   Upon evaluation: Pt requires supervision supine>sit bed mobility w/ increased time to complete, supervision sit<>stand transfer w/ VCs for hand placement and positioning, contact guard/close supervision functional mobility, MIN assist UB ADLs (R UE NWB, educated on one UE dressing techniques), MIN assist LB ADLs,supervision toileting 2* the following deficits impacting occupational performance: impaired balance, impaired activity tolerance, NWB R UE in sling, increased pain  Pt educated on home safety and environmental placement of items in home  Pt to benefit from continued skilled OT tx while in the hospital to address deficits as defined above and maximize level of functional independence w ADL's and functional mobility  Occupational Performance areas to address include: grooming, bathing/shower, toilet hygiene, dressing, health maintenance, functional mobility and clothing management  From OT standpoint, recommendation at time of d/c would be home w/ increased support and outpatient therapy       OT Discharge Recommendation: Return to previous environment with social support(outpatient therapy)  OT - OK to Discharge: (when medically stable)

## 2020-09-19 NOTE — OCCUPATIONAL THERAPY NOTE
Occupational Therapy Evaluation     Patient Name: Anabel Ray  [id number] Date: 9/19/2020  Problem List  Principal Problem:    Fracture of right humerus  Active Problems:    LBBB (left bundle branch block)    Diabetes mellitus ([facility] [address] )    CKD stage 3 secondary to diabetes [facility])    Past Medical History  Past Medical History:   Diagnosis Date    Diabetes mellitus ([facility] [address] )     IDDM    Fracture of right humerus     8/25/2019    told to manage medically for OR  repair today  9/18/2020   [facility] (hard of hearing)     has 1 hearing aid    LBBB (left bundle branch block)     Rash      right arm below elbow-chronic intermittent - daughter believes from artificial sweeteners-reported to surgeons office 9/17     Wears dentures     full uppers  partial lower    Wears glasses      Past Surgical History  Past Surgical History:   Procedure Laterality Date    APPENDECTOMY      1935 09/19/20 0925   OT Last Visit   OT Visit Date 09/19/20   Note Type   Note type Eval/Treat   Restrictions/Precautions   Weight Bearing Precautions Per Order Yes   RUE Weight Bearing Per Order NWB   Braces or Orthoses Other (Comment)  (abduction sling R UE)   Other Precautions Fall Risk;Pain;Multiple lines;Hard of hearing;WBS   Pain Assessment   Pain Assessment Tool 0-10   Pain Score 6   Pain Location/Orientation Orientation: Right;Location: Arm   Hospital Pain Intervention(s) Ambulation/increased activity;Repositioned;Cold applied; Emotional support   Home Living   Type of [address] [place] [address] Multi-level;1/2 bath on main level;Bed/bath upstairs  (0 CARLITOS, 14 steps to 2nd floor)   Bathroom Shower/Tub Tub/shower unit   Bathroom Toilet Standard   Bathroom Equipment Grab bars in shower;Grab bars around toilet; Shower chair   Bathroom Accessibility Accessible   Home Equipment Walker;Cane  (no use of DME PTA)   Additional Comments pt active PTA w/ no AD; has a 24/7 caregiver to assist him at home w/ IADLs and ADLs   Prior Function   Level of [place] Independent with ADLs and functional mobility; Needs assistance with IADLs   Lives With Other (Comment)  (cargiver)   Receives Help From Other (Comment)  (caregiver)   ADL Assistance Independent   IADLs Needs assistance   Falls in the last 6 months 0  (1 fall a year ago leading to injury)   Vocational Retired   Comments pt driving PTA; caregiver completes IADL tasks and will be able to assist w/ ADLs upon return home   Lifestyle   Autonomy per pt independent w/ ADLs, independent w/ functional transfers and mobility w/ no AD, assist w/ IADLs, driving   Reciprocal Relationships caregiver, children   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   (educated on one handed techniques)   LB Dressing Assistance 4  [address]  5  Supervision/Setup   Bed Mobility   Supine to Sit 5  Supervision   Additional items Assist x 1; Increased time required;Verbal cues;LE management   Additional Comments pt seated EOB end of session   Transfers   Sit to Stand 5  Supervision   Additional items Assist x 1; Increased time required;Verbal cues   Stand to Sit 5  Supervision   Additional items Assist x 1; Increased time required;Verbal cues   Functional Mobility   Functional Mobility 4  Minimal assistance   Additional Comments close supervision/contact guard assist w/ no AD   Balance   Static Sitting Good   Dynamic Sitting Fair +   Static Standing Fair   Dynamic Standing Fair -   Ambulatory Fair -   Activity Tolerance   Activity Tolerance Patient limited by pain; Patient tolerated treatment well   Nurse Made Aware appropriate to see per RN   RUE Assessment   RUE Assessment X  (sling in place, NWB R UE)   LUE Assessment   LUE Assessment WFL  (4/5)   Hand Function   Gross Motor Coordination   (impaired R UE 2* sling)   Fine Motor Coordination Functional   Sensation   Light Touch Partial deficits in the RUE  (sling tingling in R hand)   Proprioception   Proprioception No apparent deficits   Vision-Basic Assessment   Current Vision Wears glasses only for reading   Vision - Complex Assessment   Ocular Range of Motion [facility]   Acuity Able to read clock/calendar on wall without difficulty   Perception   Inattention/Neglect Appears intact   Cognition   Overall Cognitive Status [facility]   Arousal/Participation Alert; Cooperative   Attention Within functional limits   Orientation Level Oriented X4   Memory Within functional limits   Following Commands Follows one step commands without difficulty   Comments pt engages in appropriate conversations   Assessment   Limitation Decreased ADL status; Decreased UE ROM; Decreased UE strength;Decreased Safe judgement during ADL;Decreased cognition;Decreased endurance;Decreased self-care trans;Decreased high-level ADLs; Decreased sensation;Decreased fine motor control;Non-func R UE   Prognosis Good   Assessment Pt is a 80 y o  male seen for OT evaluation s/p admit to [facility] on 9/18/2020 w/ fall OOB 8/2019 resulting in displaced fracture of surgical neck of right humerus and conservative treatment did not improve and now, s/p ORIF humerus w/ bone grafting w/ sling in place, NWB R UE  Comorbidities affecting pt's functional performance at time of assessment include: DM I, [place]  Personal factors affecting pt at time of IE include: increased pain  Prior to admission, pt was living w/ caregiver who will provide 24/7 support/assist to pt and pt was independent w/ ADLs, independent w/ functional transfers and mobility w/ no Ad, assist w/ IADLs   Upon evaluation: Pt requires supervision supine>sit bed mobility w/ increased time to complete, supervision sit<>stand transfer w/ VCs for hand placement and positioning, contact guard/close supervision functional mobility, MIN assist UB ADLs (R UE NWB, educated on one UE dressing techniques), MIN assist LB ADLs,supervision toileting 2* the following deficits impacting occupational performance: impaired balance, impaired activity tolerance, NWB R UE in sling, increased pain  Pt educated on home safety and environmental placement of items in home  Pt to benefit from continued skilled OT tx while in the hospital to address deficits as defined above and maximize level of functional independence w ADL's and functional mobility  Occupational Performance areas to address include: grooming, bathing/shower, toilet hygiene, dressing, health maintenance, functional mobility and clothing management  From OT standpoint, recommendation at time of d/c would be home w/ increased support and outpatient therapy  Goals   Patient Goals "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"   STG Time Frame 3-5   Short Term Goal  please see below goals   Plan   Treatment Interventions ADL retraining;UE strengthening/ROM; Functional transfer training;Cognitive reorientation;Equipment evaluation/education;Patient/family training; Endurance training; Compensatory technique education; Energy conservation; Activityengagement   Goal Expiration Date 09/24/20   OT Frequency 3-5x/wk   Recommendation   OT Discharge Recommendation Return to previous environment with social support  (outpatient therapy)   OT - OK to Discharge   (when medically stable)   Barthel Index   Feeding 10   Bathing 0   Grooming Score 5   Dressing Score 5   Bladder Score 10   Bowels Score 10   Toilet Use Score 5   Transfers (Bed/Chair) Score 10   Mobility (Level Surface) Score 10   Stairs Score 5   [place] Index Score 70   Modified [place] Scale   Modified [place] Scale 4     Occupational Therapy Goals to be met in 3-5 days:  1) Pt will improve activity tolerance to G for 30 min txment sessions to enhance ADLs  2) Pt will complete ADLs/self care w/ mod I w/ one UE dressing techniques while maintaining NWB R UE  3) Pt will complete toileting w/ mod I w/ G hygiene/thoroughness using DME PRN  4) Pt will improve functional transfers on/off all surfaces using DME PRN w/ G balance/safety including toileting w/ mod I  5) Pt will improve fx'l mobility during I/ADl/leisure tasks using DME PRN w/ g balance/safety w/ mod I  6) Pt will engage in ongoing cognitive assessment w/ G participation to A w/ safe d/c planning/recommendations  7) Pt will demonstrate G carryover of pt/caregiver education and training as appropriate w/ mod I  w/ G tolerance  8) Pt will demonstrate 100% carryover of E C  techniques w/ mod I t/o fx'l I/ADL/leisure tasks w/o cues s/p skilled education  9) Pt will demonstrate improved bed mobility w/ MOD I to enhance ADLs  10) Pt will demonstrate improved standing tolerance to 3-5 minutes during functional tasks w/ Good - dynamic standing balance to enhance ADL performance       Documentation completed by: Ros Barnes MS, OTR/L

## 2020-09-19 NOTE — CONSULTS
Consult- Heriberto Lazo 12/1/1928, 80 y o  male MRN: [id number]    Unit/Bed#: E2 -01 Encounter: [id number]    Primary Care Provider: Jose Gonzáles DO   Date and time admitted to hospital: 9/18/2020  7:33 AM      Inpatient consult to Internal Medicine  Consult performed by: Caitlin Coleman MD  Consult ordered by: Chelly Day MD          * Fracture of right humerus  Assessment & Plan  Status post ORIF humerus with bone grafting  Pain management per primary team  DVT prophylaxis  PT OT    Diabetes mellitus [facility])  Assessment & Plan  Patient has insulin-dependent diabetes type 2  He is on Lantus 4 units b i d  NovoLog 6 units with breakfast, 4 units with lunch and 5 units with dinner  He says he has hemoglobin A1c has been in the range between 6 3 and 7 4, last hemoglobin A1c 7 4, 3 months ago  His blood sugar has been in the range of 200s  We will switch from regular diet to carb consistent diet, and add sliding scale      CKD stage 3 secondary to diabetes [facility])  Assessment & Plan  CKD stage 3 with baseline creatinine between 1 3 and 1 58  Creatinine 1 76 today  Currently on gentle hydration          VTE Prophylaxis: Reason for no pharmacologic prophylaxis Once cleared by ortho  / sequential compression device     Recommendations for Discharge:  · With continue home regimen of insulin since his blood sugar has been well controlled at home per patient, he is checking his blood pressure at home regularly, I would advise him to keep a blood pressure log and to follow-up with PCP in a few days for insulin adjustment if needed  Counseling / Coordination of Care Time: Thirty-five  Greater than 50% of total time spent on patient counseling and coordination of care  Collaboration of Care:  Were Recommendations Directly Discussed with Primary Treatment Team? - No     History of Present Illness:    Heriberto Lazo is a 80 y o  male who is originally admitted to the ortho service due to right humeral fracture  We are consulted for medical management  Patient is a 80-year-old male with a past medical history of insulin-dependent diabetes type 2, CKD stage 3  Per patient he fell about 3 weeks ago he fell out of the bed, he was having a nightmare  He followed up at he was told that the fracture will heal on its own  Since he did not he was scheduled for surgery  Patient has displaced fracture of the surgical neck of the right humerus  He underwent ORIF with bone grafting  Today patient is feeling well  He had difficulty swallowing yesterday most likely from intubation  He said this much better today  He denies headache, visual changes, dizziness, chest pain palpitation shortness of breath nausea vomiting abdominal pain diarrhea constipation or trouble with urination  Review of Systems:    Review of Systems all reviewed and negative except as above  Past Medical and Surgical History:     Past Medical History:   Diagnosis Date    Diabetes mellitus ([facility] [address] )     IDDM    Fracture of right humerus     8/25/2019    told to manage medically for OR  repair today  9/18/2020   [facility] (hard of hearing)     has 1 hearing aid    LBBB (left bundle branch block)     Rash      right arm below elbow-chronic intermittent - daughter believes from artificial sweeteners-reported to surgeons office 9/17     Wears dentures     full uppers  partial lower    Wears glasses        Past Surgical History:   Procedure Laterality Date    APPENDECTOMY      1935       Meds/Allergies:    all medications and allergies reviewed    Allergies:    Allergies   Allergen Reactions    Shellfish-Derived Products Itching and Other (See Comments)     Unsure of reaction       Social History:     Marital Status: Unknown    Substance Use History:   Social History     Substance and Sexual Activity   Alcohol Use Yes    Frequency: Monthly or less    Drinks per session: 1 or 2    Binge frequency: Never    Comment: wine     Social History     Tobacco Use   Smoking Status Former Smoker    Packs/day: 1 00    Years: 13 00    Pack years: 13 00    Last attempt to quit: 1965    Years since quittin 7   Smokeless Tobacco Never Used     Social History     Substance and Sexual Activity   Drug Use Never       Family History:  Noncontributory    Physical Exam:     Vitals:   Blood Pressure: 120/59 (20 0500)  Pulse: 67 (20 0500)  Temperature: 97 6 °F (36 4 °C) (20)  Temp Source: Temporal (20)  Respirations: 18 (20 0500)  Height: 5' 10" (177 8 cm) (20)  Weight - Scale: 73 5 kg (162 lb) (20)  SpO2: 100 % (20)    Physical Exam  Constitutional:       General: He is not in acute distress  Appearance: He is not toxic-appearing  HENT:      Head: Normocephalic and atraumatic  Neck:      Musculoskeletal: Neck supple  Cardiovascular:      Rate and Rhythm: Normal rate and regular rhythm  Heart sounds: No murmur  No friction rub  No gallop  Pulmonary:      Effort: Pulmonary effort is normal  No respiratory distress  Breath sounds: Normal breath sounds  No wheezing  Abdominal:      General: Abdomen is flat  Bowel sounds are normal  There is no distension  Palpations: Abdomen is soft  Tenderness: There is no abdominal tenderness  Musculoskeletal:      Comments: Dressing on the right shoulder, arm in sling   Skin:     General: Skin is warm and dry  Neurological:      General: No focal deficit present  Mental Status: He is alert  Psychiatric:         Mood and Affect: Mood normal        Additional Data:     Lab Results: I have personally reviewed pertinent reports            Results from last 7 days   Lab Units 20  0457   SODIUM mmol/L 136   POTASSIUM mmol/L 4 6   CHLORIDE mmol/L 105   CO2 mmol/L 24   BUN mg/dL 32*   CREATININE mg/dL 1 76*   ANION GAP mmol/L 7   CALCIUM mg/dL 7 1*   GLUCOSE RANDOM mg/dL 254*             No results found for: HGBA1C  Results from last 7 days   Lab Units 09/19/20  0713 09/18/20  2133 09/18/20  1612 09/18/20  1216 09/18/20  0832   POC GLUCOSE mg/dl 257* 292* 276* 245* 192*           Imaging: I have personally reviewed pertinent reports  XR shoulder right 2+ views    (Results Pending)       EKG, Pathology, and Other Studies Reviewed on Admission:   · EKG:     ** Please Note: This note has been constructed using a voice recognition system   **

## 2020-09-19 NOTE — PLAN OF CARE
Problem: PHYSICAL THERAPY ADULT  Goal: Performs mobility at highest level of function for planned discharge setting  See evaluation for individualized goals  Description: Treatment/Interventions: Functional transfer training, LE strengthening/ROM, Elevations, Therapeutic exercise, Endurance training, Patient/family training, Bed mobility, Gait training, Spoke to nursing, OT          See flowsheet documentation for full assessment, interventions and recommendations  Note: Prognosis: Good  Problem List: Decreased strength, Decreased range of motion, Decreased endurance, Impaired balance, Decreased mobility, Pain, Orthopedic restrictions  Assessment: Pt  89 y o male s/p R shoulder ORIF w/ bone graft 2* to  2-part displaced fracture of surgical neck of right humerus, initial encounter for closed fracture S42 221A  Pt referred to PT for mobility assessment & D/C planning w/ orders of NWB to CT  PTA, pt reports being I w/o AD  Pt lives w/ his 24/7 caregiver in a [address]  On eval, pt demonstrate minimal dec mobility, balance, endurance & amb  Pt require S for most functional mobility + cues for techniques  Gait deviations as above, slow w/ dec foot clearance but no gross LOB noted  Pt able to negotiate  stairs w/ S + cues for safety  No dizziness reported t/o session  Nsg staff most recent vital signs as follows: /58 (BP Location: Left arm)   Pulse 62   Temp (!) 97 °F (36 1 °C) (Temporal)   Resp 16   Ht 5' 10" (1 778 m)   Wt 73 5 kg (162 lb)   SpO2 98%   BMI 23 24 kg/m²   At end of session, pt requested to sit at EOB w/o issues  Call bell & phone in reach  Fall precautions reinforced w/ good understanding  Will continue skilled PT to improve function & safety  Pt states no concerns about going home  From PT standpoint, pt may return home today when medically cleared  Will recommend OPPT & inc support from his 24/7 caregiver at D/C  CM to follow   Nsg staff to continue to mobilized pt (OOB in chair for all meals & ambulate in room/unit) as tolerated to prevent further decline in function  Nsg notified  Barriers to Discharge: None     PT Discharge Recommendation: Home with skilled therapy(OPPT )     PT - OK to Discharge: Yes(when medically cleared)    See flowsheet documentation for full assessment

## 2020-09-19 NOTE — PHYSICAL THERAPY NOTE
PT EVALUATION    Pt  Name: Rakan Leblanc  Pt  Age: 80 y o  MRN: [id number]  LENGTH OF STAY: 0    Patient Active Problem List   Diagnosis    LBBB (left bundle branch block)    Fracture of right humerus    Diabetes mellitus ([facility] )    CKD stage 3 secondary to diabetes [facility])       Admitting Diagnoses:   2-part displaced fracture of surgical neck of right humerus, initial encounter for closed fracture [[id number]]    Past Medical History:   Diagnosis Date    Diabetes mellitus ([facility] )     IDDM    Fracture of right humerus     8/25/2019    told to manage medically for OR  repair today  9/18/2020    [place] (hard of hearing)     has 1 hearing aid    LBBB (left bundle branch block)     Rash      right arm below elbow-chronic intermittent - daughter believes from artificial sweeteners-reported to surgeons office 9/17     Wears dentures     full uppers  partial lower    Wears glasses        Past Surgical History:   Procedure Laterality Date    APPENDECTOMY      1935       Imaging Studies:  XR shoulder right 2+ views    (Results Pending)        09/19/20 0926   PT Last Visit   PT Visit Date 09/19/20   Note Type   Note type Eval/Treat   Pain Assessment   Pain Score 6   Pain Location/Orientation Orientation: Right;Location: Arm   Hospital Pain Intervention(s) Repositioned; Ambulation/increased activity; Emotional support; Rest   Home Living   Type of [address] Multi-level;1/2 bath on main level;Bed/bath upstairs  (0STE; 14 steps to 2nd flr bed/bath)   Bathroom Shower/Tub Tub/shower unit   Bathroom Toilet Standard   Bathroom Equipment Grab bars in shower;Grab bars around toilet; Shower chair   Home Equipment Walker;Cane  (does not use at baseline)   Additional Comments Pt has 24/7 caregiver to assist    Prior Function   Level of [place] Independent with ADLs and functional mobility  (w/o AD)   Lives With Other (Comment)  (caregiver)   Receives Help From Personal care attendant   ADL Assistance Independent   Falls in the last 6 months 0  (had 1 fall a yr ago resulting to R shoulder fx)   Vocational Retired   Comments (+)    Restrictions/Precautions   Wells Combs Bearing Precautions Per Order Yes   RUE Weight Bearing Per Order NWB   Braces or Orthoses Sling  (w/ abductor pillow)   Other Precautions Fall Risk;Pain;Multiple lines;Hard of hearing;WBS   General   Additional Pertinent History Pt s/p fall a year ago resulting to R shoulder fx which was treated conservatively but failed hence admitted for R shoulder fx repair  Family/Caregiver Present No   Cognition   Overall Cognitive Status WFL   Arousal/Participation Alert   Attention Within functional limits   Orientation Level Oriented X4   Following Commands Follows one step commands without difficulty   RUE Assessment   RUE Assessment   (refer to OT)   LUE Assessment   LUE Assessment   (refer to OT)   RLE Assessment   RLE Assessment WFL  (4+/5 grossly)   LLE Assessment   LLE Assessment WFL  (4+/5 grossly)   Coordination   Movements are Fluid and Coordinated 1   Sensation WFL   Bed Mobility   Supine to Sit 5  Supervision   Additional items HOB elevated; Increased time required;Verbal cues   Additional Comments cues for techniques & safety   Transfers   Sit to Stand 5  Supervision   Additional items Increased time required;Verbal cues   Stand to Sit 5  Supervision   Additional items Increased time required;Verbal cues   Additional Comments cues for techniques & safety   Ambulation/Elevation   Gait pattern Decreased foot clearance; Excessively slow   Gait Assistance 5  Supervision   Additional items Verbal cues   Assistive Device None   Distance 250'x1   Stair Management Assistance 5  Supervision   Additional items Verbal cues; Increased time required   Stair Management Technique One rail L;Alternating pattern; Foreward;Reciprocal   Number of Stairs 5  (x2;  stairs)   Balance   Static Sitting Good   Static Standing [address] [place]  Deficit   Endurance Deficit No   Activity Tolerance   Activity Tolerance Patient tolerated treatment well   Medical Staff Made Aware FLOR Rousseau   Nurse Made Aware Naresh Duffy   Assessment   Prognosis Good   Problem List Decreased strength;Decreased range of motion;Decreased endurance; Impaired balance;Decreased mobility;Pain;Orthopedic restrictions   Assessment Pt  89 y o male s/p R shoulder ORIF w/ bone graft 2* to  2-part displaced fracture of surgical neck of right humerus, initial encounter for closed fracture S42 221A  Pt referred to PT for mobility assessment & D/C planning w/ orders of NWB to RUE  PTA, pt reports being I w/o AD  Pt lives w/ his 24/7 caregiver in a [facility]  On eval, pt demonstrate minimal dec mobility, balance, endurance & amb  Pt require S for most functional mobility + cues for techniques  Gait deviations as above, slow w/ dec foot clearance but no gross LOB noted  Pt able to negotiate  stairs w/ S + cues for safety  No dizziness reported t/o session  Nsg staff most recent vital signs as follows: /58 (BP Location: Left arm)   Pulse 62   Temp (!) 97 °F (36 1 °C) (Temporal)   Resp 16   Ht 5' 10" (1 778 m)   Wt 73 5 kg (162 lb)   SpO2 98%   BMI 23 24 kg/m²   At end of session, pt requested to sit at EOB w/o issues  Call bell & phone in reach  Fall precautions reinforced w/ good understanding  Will continue skilled PT to improve function & safety  Pt states no concerns about going home  From PT standpoint, pt may return home today when medically cleared  Will recommend OPPT & inc support from his 24/7 caregiver at D/C  CM to follow  Nsg staff to continue to mobilized pt (OOB in chair for all meals & ambulate in room/unit) as tolerated to prevent further decline in function  Nsg notified      Barriers to Discharge None   Goals   Patient Goals to get better   STG Expiration Date 09/26/20   Short Term Goal #1 Goals to be met in 7 days; pt will be able to: 1) inc strength & balance by 1/2 grade to improve overall functional mobility & dec fall risk; 2) inc bed mobility to modified I for pt to be able to get in/OOB safely w/ proper techniques 100% of the time, to dec caregiver burden & safely function at home; 3) inc transfers to modified I for pt to transition safely from one surface to another w/o % of the time, to dec caregiver burden & safely function at home; 4) inc amb w/ appropriate AD approx  >350' w/ I/modified I for pt to ambulate community distances w/o any % of the time, to dec caregiver burden & safely function at home; 5) inc barthel score to 100 to decrease overall risk for falls; 6) negotiate stairs w/ modified I for inc safety during stair mgt inside/outside of home & dec caregiver burden; 7) pt/caregiver ed   PT Treatment Day 0   Plan   Treatment/Interventions Functional transfer training;LE strengthening/ROM; Elevations; Therapeutic exercise; Endurance training;Patient/family training;Bed mobility;Gait training;Spoke to nursing;OT   PT Frequency Other (Comment)  (4-5x/wk)   Recommendation   PT Discharge Recommendation Home with skilled therapy  (OPPT )   PT - OK to Discharge Yes  (when medically cleared)   Barthel Index   Feeding 10   Bathing 0   Grooming Score 5   Dressing Score 5   Bladder Score 10   Bowels Score 10   Toilet Use Score 5   Transfers (Bed/Chair) Score 10   Mobility (Level Surface) Score 10   Stairs Score 5   Barthel Index Score 70   Hx/personal factors: co-morbidities, inaccessible home, advanced age, mutliple lines, pain, WB restrictions, fall risk and assist w/ ADL's  Examination: dec mobility, dec balance, dec endurance, dec amb, moderate fall risk, pain, WB restrictions  Clinical: unpredictable (ongoing medical status, abnormal lab values, moderate fall risk and pain mgt)  Complexity: high     [place] Mon, PT

## 2020-09-19 NOTE — NURSING NOTE
Patient discharged at this time  IV removed  Discharge paperwork reviewed with verbal understanding  Patient left in stable condition

## 2020-09-19 NOTE — ASSESSMENT & PLAN NOTE
Status post ORIF humerus with bone grafting  Pain management per primary team  Bowel regiment  DVT prophylaxis  PT OT

## 2020-09-19 NOTE — ASSESSMENT & PLAN NOTE
CKD stage 3 with baseline creatinine between 1 3 and 1 58  Creatinine 1 76 today  Currently on gentle hydration

## 2020-09-19 NOTE — NURSING NOTE
Pt complaining of dizziness and light headed after having blood drawn by PCA  Patient instructed to lay down by nurse  VS are stable see chart  Dizziness and light headedness resolved   Will continue to monitor

## 2020-09-19 NOTE — PLAN OF CARE
Problem: Potential for Falls  Goal: Patient will remain free of falls  Description: INTERVENTIONS:  - Assess patient frequently for physical needs  -  Identify cognitive and physical deficits and behaviors that affect risk of falls  -  Mobile fall precautions as indicated by assessment   - Educate patient/family on patient safety including physical limitations  - Instruct patient to call for assistance with activity based on assessment  - Modify environment to reduce risk of injury  - Consider OT/PT consult to assist with strengthening/mobility  9/19/2020 1330 by Nate Calhoun RN  Outcome: Adequate for Discharge  9/19/2020 0920 by Nate Calhoun RN  Outcome: Progressing     Problem: PAIN - ADULT  Goal: Verbalizes/displays adequate comfort level or baseline comfort level  Description: Interventions:  - Encourage patient to monitor pain and request assistance  - Assess pain using appropriate pain scale  - Administer analgesics based on type and severity of pain and evaluate response  - Implement non-pharmacological measures as appropriate and evaluate response  - Consider cultural and social influences on pain and pain management  - Notify physician/advanced practitioner if interventions unsuccessful or patient reports new pain  9/19/2020 1330 by Nate Calhoun RN  Outcome: Adequate for Discharge  9/19/2020 0920 by Nate Calhoun RN  Outcome: Progressing     Problem: SAFETY ADULT  Goal: Patient will remain free of falls  Description: INTERVENTIONS:  - Assess patient frequently for physical needs  -  Identify cognitive and physical deficits and behaviors that affect risk of falls    -  Mobile fall precautions as indicated by assessment   - Educate patient/family on patient safety including physical limitations  - Instruct patient to call for assistance with activity based on assessment  - Modify environment to reduce risk of injury  - Consider OT/PT consult to assist with strengthening/mobility  9/19/2020 1330 by Esha Jackman RN  Outcome: Adequate for Discharge  9/19/2020 0920 by Esha Jackman RN  Outcome: Progressing  Goal: Maintain or return to baseline ADL function  Description: INTERVENTIONS:  -  Assess patient's ability to carry out ADLs; assess patient's baseline for ADL function and identify physical deficits which impact ability to perform ADLs (bathing, care of mouth/teeth, toileting, grooming, dressing, etc )  - Assess/evaluate cause of self-care deficits   - Assess range of motion  - Assess patient's mobility; develop plan if impaired  - Assess patient's need for assistive devices and provide as appropriate  - Encourage maximum independence but intervene and supervise when necessary  - Involve family in performance of ADLs  - Assess for home care needs following discharge   - Consider OT consult to assist with ADL evaluation and planning for discharge  - Provide patient education as appropriate  9/19/2020 1330 by Esha Jackman RN  Outcome: Adequate for Discharge  9/19/2020 0920 by Esha Jackman RN  Outcome: Progressing  Goal: Maintain or return mobility status to optimal level  Description: INTERVENTIONS:  - Assess patient's baseline mobility status (ambulation, transfers, stairs, etc )    - Identify cognitive and physical deficits and behaviors that affect mobility  - Identify mobility aids required to assist with transfers and/or ambulation (gait belt, sit-to-stand, lift, walker, cane, etc )  - [place] fall precautions as indicated by assessment  - Record patient progress and toleration of activity level on Mobility SBAR; progress patient to next Phase/Stage  - Instruct patient to call for assistance with activity based on assessment  - Consider rehabilitation consult to assist with strengthening/weightbearing, etc   9/19/2020 1330 by Esha Jackman RN  Outcome: Adequate for Discharge  9/19/2020 0920 by Esha Jackman RN  Outcome: Progressing     Problem: DISCHARGE PLANNING  Goal: Discharge to home or other facility with appropriate resources  Description: INTERVENTIONS:  - Identify barriers to discharge w/patient and caregiver  - Arrange for needed discharge resources and transportation as appropriate  - Identify discharge learning needs (meds, wound care, etc )  - Arrange for interpretive services to assist at discharge as needed  - Refer to Case Management Department for coordinating discharge planning if the patient needs post-hospital services based on physician/advanced practitioner order or complex needs related to functional status, cognitive ability, or social support system  9/19/2020 1330 by Norma Clark RN  Outcome: Adequate for Discharge  9/19/2020 0920 by Norma Clark RN  Outcome: Progressing     Problem: Knowledge Deficit  Goal: Patient/family/caregiver demonstrates understanding of disease process, treatment plan, medications, and discharge instructions  Description: Complete learning assessment and assess knowledge base    Interventions:  - Provide teaching at level of understanding  - Provide teaching via preferred learning methods  9/19/2020 1330 by Norma Clark RN  Outcome: Adequate for Discharge  9/19/2020 0920 by Norma Clark RN  Outcome: Progressing     Problem: COPING  Goal: Pt/Family able to verbalize concerns and demonstrate effective coping strategies  Description: INTERVENTIONS:  - Assist patient/family to identify coping skills, available support systems and cultural and spiritual values  - Provide emotional support, including active listening and acknowledgement of concerns of patient and caregivers  - Reduce environmental stimuli, as able  - Provide patient education  - Assess for spiritual pain/suffering and initiate spiritual care, including notification of Pastoral Care or [place] based community as needed  - Assess effectiveness of coping strategies  9/19/2020 1330 by Norma Clark RN  Outcome: Adequate for Discharge  9/19/2020 0920 by Norma Clark RN  Outcome: Progressing  Goal: Will report anxiety at manageable levels  Description: INTERVENTIONS:  - Administer medication as ordered  - Teach and encourage coping skills  - Provide emotional support  - Assess patient/family for anxiety and ability to cope  9/19/2020 1330 by Yonny Polo RN  Outcome: Adequate for Discharge  9/19/2020 0920 by Yonny Polo, RN  Outcome: Progressing

## 2020-09-19 NOTE — PROGRESS NOTES
PCA took wheelchair into patient for discharge  Daughter asked if patients shirt could be placed on patient due to arm sling  Patient standing with this nurse, student nurse and PCA  Shirt being applied and knees began to buckle  Patient had no memory of event  He was placed in a chair  Blood pressure obtained 160/69 sitting  When stood up blood pressure dropped to 74/52  Denied dizziness or lightheadedness  Manuals also taken  Sitting 154/64 and standing 102/60  Doctor made aware  She came to evaluate patient  Blood pressures checked while MD present  Upon standing patient began to sway and had to be sat down  New orders noted  Plan explained to patient for him to receive a bolus and need to replace IV  Will continue to monitor  Call bell in place  Daughter at bedside

## 2020-09-19 NOTE — PROGRESS NOTES
Patient finished his bolus of fluids  Sitting bp 171/72 and standing bp 136/50  Patient stable on his feet  Denies any dizziness  Will make MD aware

## 2020-10-07 ENCOUNTER — PREP FOR PROCEDURE (OUTPATIENT)
Dept: OBGYN CLINIC | Facility: OTHER | Age: 85
End: 2020-10-07

## 2020-10-07 DIAGNOSIS — S42.221A CLOSED 2-PART DISPLACED FRACTURE OF SURGICAL NECK OF RIGHT HUMERUS, INITIAL ENCOUNTER: Primary | ICD-10-CM

## 2020-10-15 ENCOUNTER — ANESTHESIA EVENT (INPATIENT)
Dept: PERIOP | Facility: HOSPITAL | Age: 85
DRG: 483 | End: 2020-10-15
Payer: COMMERCIAL

## 2020-10-16 ENCOUNTER — HOSPITAL ENCOUNTER (OUTPATIENT)
Dept: RADIOLOGY | Facility: HOSPITAL | Age: 85
Setting detail: SURGERY ADMIT
Discharge: HOME | DRG: 483 | End: 2020-10-16
Payer: COMMERCIAL

## 2020-10-16 ENCOUNTER — APPOINTMENT (INPATIENT)
Dept: RADIOLOGY | Facility: HOSPITAL | Age: 85
DRG: 483 | End: 2020-10-16
Payer: COMMERCIAL

## 2020-10-16 ENCOUNTER — ANESTHESIA (INPATIENT)
Dept: PERIOP | Facility: HOSPITAL | Age: 85
DRG: 483 | End: 2020-10-16
Payer: COMMERCIAL

## 2020-10-16 ENCOUNTER — HOSPITAL ENCOUNTER (INPATIENT)
Facility: HOSPITAL | Age: 85
LOS: 3 days | Discharge: HOME | DRG: 483 | End: 2020-10-19
Attending: ORTHOPAEDIC SURGERY | Admitting: ORTHOPAEDIC SURGERY
Payer: COMMERCIAL

## 2020-10-16 VITALS — HEART RATE: 96 BPM

## 2020-10-16 DIAGNOSIS — S42.221A CLOSED 2-PART DISPLACED FRACTURE OF SURGICAL NECK OF RIGHT HUMERUS, INITIAL ENCOUNTER: ICD-10-CM

## 2020-10-16 DIAGNOSIS — S42.321A CLOSED DISPLACED TRANSVERSE FRACTURE OF SHAFT OF RIGHT HUMERUS, INITIAL ENCOUNTER: Primary | ICD-10-CM

## 2020-10-16 DIAGNOSIS — J93.9 PNEUMOTHORAX ON RIGHT: ICD-10-CM

## 2020-10-16 PROBLEM — S42.309A CLOSED FRACTURE OF SHAFT OF HUMERUS: Status: ACTIVE | Noted: 2019-08-30

## 2020-10-16 PROBLEM — S42.291A HUMERUS HEAD FRACTURE, RIGHT, CLOSED, INITIAL ENCOUNTER: Status: ACTIVE | Noted: 2019-08-25

## 2020-10-16 LAB
ABO GROUP BLD: NORMAL
ABO GROUP BLD: NORMAL
BLD GP AB SCN SERPL QL: NEGATIVE
GLUCOSE SERPL-MCNC: 150 MG/DL (ref 65–140)
GLUCOSE SERPL-MCNC: 203 MG/DL (ref 65–140)
GLUCOSE SERPL-MCNC: 236 MG/DL (ref 65–140)
GLUCOSE SERPL-MCNC: 274 MG/DL (ref 65–140)
RH BLD: POSITIVE
RH BLD: POSITIVE
SARS-COV-2 RNA RESP QL NAA+PROBE: NEGATIVE
SPECIMEN EXPIRATION DATE: NORMAL

## 2020-10-16 PROCEDURE — 0RRJ00Z REPLACEMENT OF RIGHT SHOULDER JOINT WITH REVERSE BALL AND SOCKET SYNTHETIC SUBSTITUTE, OPEN APPROACH: ICD-10-PCS | Performed by: ORTHOPAEDIC SURGERY

## 2020-10-16 PROCEDURE — 82948 REAGENT STRIP/BLOOD GLUCOSE: CPT

## 2020-10-16 PROCEDURE — C1776 JOINT DEVICE (IMPLANTABLE): HCPCS | Performed by: ORTHOPAEDIC SURGERY

## 2020-10-16 PROCEDURE — 0PPF04Z REMOVAL OF INTERNAL FIXATION DEVICE FROM RIGHT HUMERAL SHAFT, OPEN APPROACH: ICD-10-PCS | Performed by: ORTHOPAEDIC SURGERY

## 2020-10-16 PROCEDURE — 71045 X-RAY EXAM CHEST 1 VIEW: CPT

## 2020-10-16 PROCEDURE — 73030 X-RAY EXAM OF SHOULDER: CPT

## 2020-10-16 PROCEDURE — 99253 IP/OBS CNSLTJ NEW/EST LOW 45: CPT | Performed by: INTERNAL MEDICINE

## 2020-10-16 PROCEDURE — 87635 SARS-COV-2 COVID-19 AMP PRB: CPT | Performed by: ORTHOPAEDIC SURGERY

## 2020-10-16 PROCEDURE — 86920 COMPATIBILITY TEST SPIN: CPT

## 2020-10-16 PROCEDURE — C1713 ANCHOR/SCREW BN/BN,TIS/BN: HCPCS | Performed by: ORTHOPAEDIC SURGERY

## 2020-10-16 PROCEDURE — 86901 BLOOD TYPING SEROLOGIC RH(D): CPT | Performed by: ORTHOPAEDIC SURGERY

## 2020-10-16 PROCEDURE — 86850 RBC ANTIBODY SCREEN: CPT | Performed by: ORTHOPAEDIC SURGERY

## 2020-10-16 PROCEDURE — 94762 N-INVAS EAR/PLS OXIMTRY CONT: CPT

## 2020-10-16 PROCEDURE — 99222 1ST HOSP IP/OBS MODERATE 55: CPT | Performed by: SURGERY

## 2020-10-16 PROCEDURE — 86900 BLOOD TYPING SEROLOGIC ABO: CPT | Performed by: ORTHOPAEDIC SURGERY

## 2020-10-16 PROCEDURE — C9290 INJ, BUPIVACAINE LIPOSOME: HCPCS | Performed by: ANESTHESIOLOGY

## 2020-10-16 DEVICE — IMPLANTABLE DEVICE
Type: IMPLANTABLE DEVICE | Site: SHOULDER | Status: FUNCTIONAL
Brand: TRABECULAR METAL®

## 2020-10-16 DEVICE — PALACOS® R+G IS A FAST SETTING POLYMER CONTAINING GENTAMICIN, FOR USE IN BONE SURGERY. MIXING OF THE TWO COMPONENT SYSTEM, CONSISTING OF A POWDER AND A LIQUID, INITIALLY PRODUCES A LIQUID AND THEN A PASTE, WHICH IS USED TO ANCHOR THE PROSTHESIS TO THE BONE. THE HARDENED BONE CEMENT ALLOWS STABLE FIXATION OF THE PROSTHESIS AND TRANSFERS ALL STRESSES PRODUCED IN A MOVEMENT TO THE BONE VIA THE LARGE INTERFACE. INSOLUBLE ZIRCONIUM DIOXIDE IS INCLUDED IN THE CEMENT POWDER AS AN X RAY CONTRAST MEDIUM. THE CHLOROPHYLL ADDITIVE SERVES AS OPTICAL MARKING OF THE BONE CEMENT AT THE SITE OF THE OPERATION.
Type: IMPLANTABLE DEVICE | Site: HUMERUS | Status: FUNCTIONAL
Brand: PALACOS®

## 2020-10-16 DEVICE — IMPLANTABLE DEVICE
Type: IMPLANTABLE DEVICE | Site: SHOULDER | Status: FUNCTIONAL
Brand: ANATOMICAL SHOULDER™

## 2020-10-16 RX ORDER — DOCUSATE SODIUM 100 MG/1
100 CAPSULE, LIQUID FILLED ORAL 2 TIMES DAILY
Status: DISCONTINUED | OUTPATIENT
Start: 2020-10-16 | End: 2020-10-19 | Stop reason: HOSPADM

## 2020-10-16 RX ORDER — ROCURONIUM BROMIDE 10 MG/ML
INJECTION, SOLUTION INTRAVENOUS AS NEEDED
Status: DISCONTINUED | OUTPATIENT
Start: 2020-10-16 | End: 2020-10-16

## 2020-10-16 RX ORDER — SODIUM CHLORIDE 9 MG/ML
100 INJECTION, SOLUTION INTRAVENOUS CONTINUOUS
Status: DISCONTINUED | OUTPATIENT
Start: 2020-10-16 | End: 2020-10-19 | Stop reason: HOSPADM

## 2020-10-16 RX ORDER — ASPIRIN 325 MG
325 TABLET ORAL DAILY
Status: DISCONTINUED | OUTPATIENT
Start: 2020-10-16 | End: 2020-10-19 | Stop reason: HOSPADM

## 2020-10-16 RX ORDER — ROPIVACAINE HYDROCHLORIDE 5 MG/ML
INJECTION, SOLUTION EPIDURAL; INFILTRATION; PERINEURAL AS NEEDED
Status: DISCONTINUED | OUTPATIENT
Start: 2020-10-16 | End: 2020-10-16

## 2020-10-16 RX ORDER — ACETAMINOPHEN 325 MG/1
325 TABLET ORAL EVERY 4 HOURS PRN
Status: DISCONTINUED | OUTPATIENT
Start: 2020-10-16 | End: 2020-10-19 | Stop reason: HOSPADM

## 2020-10-16 RX ORDER — ALBUTEROL SULFATE 2.5 MG/3ML
2.5 SOLUTION RESPIRATORY (INHALATION) ONCE
Status: COMPLETED | OUTPATIENT
Start: 2020-10-16 | End: 2020-10-16

## 2020-10-16 RX ORDER — ONDANSETRON 2 MG/ML
4 INJECTION INTRAMUSCULAR; INTRAVENOUS EVERY 6 HOURS PRN
Status: DISCONTINUED | OUTPATIENT
Start: 2020-10-16 | End: 2020-10-19 | Stop reason: HOSPADM

## 2020-10-16 RX ORDER — FENTANYL CITRATE 50 UG/ML
INJECTION, SOLUTION INTRAMUSCULAR; INTRAVENOUS AS NEEDED
Status: DISCONTINUED | OUTPATIENT
Start: 2020-10-16 | End: 2020-10-16

## 2020-10-16 RX ORDER — OXYCODONE HYDROCHLORIDE 5 MG/1
5 TABLET ORAL EVERY 4 HOURS PRN
Status: DISCONTINUED | OUTPATIENT
Start: 2020-10-16 | End: 2020-10-19 | Stop reason: HOSPADM

## 2020-10-16 RX ORDER — INSULIN GLARGINE 100 [IU]/ML
4 INJECTION, SOLUTION SUBCUTANEOUS 2 TIMES DAILY
Status: DISCONTINUED | OUTPATIENT
Start: 2020-10-16 | End: 2020-10-17

## 2020-10-16 RX ORDER — OXYCODONE HYDROCHLORIDE 5 MG/1
2.5 TABLET ORAL EVERY 4 HOURS PRN
Status: DISCONTINUED | OUTPATIENT
Start: 2020-10-16 | End: 2020-10-19 | Stop reason: HOSPADM

## 2020-10-16 RX ORDER — SODIUM CHLORIDE 9 MG/ML
125 INJECTION, SOLUTION INTRAVENOUS CONTINUOUS
Status: DISCONTINUED | OUTPATIENT
Start: 2020-10-16 | End: 2020-10-16

## 2020-10-16 RX ORDER — EPHEDRINE SULFATE 50 MG/ML
INJECTION INTRAVENOUS AS NEEDED
Status: DISCONTINUED | OUTPATIENT
Start: 2020-10-16 | End: 2020-10-16

## 2020-10-16 RX ORDER — FENTANYL CITRATE/PF 50 MCG/ML
25 SYRINGE (ML) INJECTION
Status: DISCONTINUED | OUTPATIENT
Start: 2020-10-16 | End: 2020-10-16 | Stop reason: HOSPADM

## 2020-10-16 RX ORDER — ACETAMINOPHEN 325 MG/1
650 TABLET ORAL EVERY 6 HOURS PRN
Status: DISCONTINUED | OUTPATIENT
Start: 2020-10-16 | End: 2020-10-16

## 2020-10-16 RX ORDER — CEFAZOLIN SODIUM 2 G/50ML
2000 SOLUTION INTRAVENOUS ONCE
Status: COMPLETED | OUTPATIENT
Start: 2020-10-16 | End: 2020-10-16

## 2020-10-16 RX ORDER — MAGNESIUM HYDROXIDE 1200 MG/15ML
LIQUID ORAL AS NEEDED
Status: DISCONTINUED | OUTPATIENT
Start: 2020-10-16 | End: 2020-10-16 | Stop reason: HOSPADM

## 2020-10-16 RX ORDER — MAGNESIUM HYDROXIDE/ALUMINUM HYDROXICE/SIMETHICONE 120; 1200; 1200 MG/30ML; MG/30ML; MG/30ML
30 SUSPENSION ORAL EVERY 6 HOURS PRN
Status: DISCONTINUED | OUTPATIENT
Start: 2020-10-16 | End: 2020-10-19 | Stop reason: HOSPADM

## 2020-10-16 RX ORDER — SODIUM CHLORIDE 9 MG/ML
INJECTION, SOLUTION INTRAVENOUS CONTINUOUS PRN
Status: DISCONTINUED | OUTPATIENT
Start: 2020-10-16 | End: 2020-10-16

## 2020-10-16 RX ORDER — MIDAZOLAM HYDROCHLORIDE 2 MG/2ML
INJECTION, SOLUTION INTRAMUSCULAR; INTRAVENOUS AS NEEDED
Status: DISCONTINUED | OUTPATIENT
Start: 2020-10-16 | End: 2020-10-16

## 2020-10-16 RX ORDER — ONDANSETRON 2 MG/ML
4 INJECTION INTRAMUSCULAR; INTRAVENOUS ONCE AS NEEDED
Status: DISCONTINUED | OUTPATIENT
Start: 2020-10-16 | End: 2020-10-16 | Stop reason: HOSPADM

## 2020-10-16 RX ORDER — CEFAZOLIN SODIUM 2 G/50ML
2000 SOLUTION INTRAVENOUS EVERY 8 HOURS
Status: COMPLETED | OUTPATIENT
Start: 2020-10-16 | End: 2020-10-17

## 2020-10-16 RX ORDER — HYDROMORPHONE HCL/PF 1 MG/ML
0.5 SYRINGE (ML) INJECTION
Status: DISCONTINUED | OUTPATIENT
Start: 2020-10-16 | End: 2020-10-16 | Stop reason: HOSPADM

## 2020-10-16 RX ORDER — ACETAMINOPHEN 325 MG/1
325 TABLET ORAL EVERY 6 HOURS PRN
COMMUNITY
End: 2020-10-19 | Stop reason: HOSPADM

## 2020-10-16 RX ORDER — SENNOSIDES 8.6 MG
1 TABLET ORAL DAILY
Status: DISCONTINUED | OUTPATIENT
Start: 2020-10-16 | End: 2020-10-19 | Stop reason: HOSPADM

## 2020-10-16 RX ORDER — PROPOFOL 10 MG/ML
INJECTION, EMULSION INTRAVENOUS AS NEEDED
Status: DISCONTINUED | OUTPATIENT
Start: 2020-10-16 | End: 2020-10-16

## 2020-10-16 RX ADMIN — ALBUTEROL SULFATE 2.5 MG: 2.5 SOLUTION RESPIRATORY (INHALATION) at 12:10

## 2020-10-16 RX ADMIN — ACETAMINOPHEN 325 MG: 325 TABLET ORAL at 22:45

## 2020-10-16 RX ADMIN — SODIUM CHLORIDE 100 ML/HR: 0.9 INJECTION, SOLUTION INTRAVENOUS at 14:04

## 2020-10-16 RX ADMIN — FENTANYL CITRATE 100 MCG: 50 INJECTION, SOLUTION INTRAMUSCULAR; INTRAVENOUS at 10:04

## 2020-10-16 RX ADMIN — EPHEDRINE SULFATE 20 MG: 50 INJECTION, SOLUTION INTRAVENOUS at 10:13

## 2020-10-16 RX ADMIN — EPHEDRINE SULFATE 10 MG: 50 INJECTION, SOLUTION INTRAVENOUS at 10:16

## 2020-10-16 RX ADMIN — SODIUM CHLORIDE 100 ML/HR: 0.9 INJECTION, SOLUTION INTRAVENOUS at 20:35

## 2020-10-16 RX ADMIN — SENNOSIDES 8.6 MG: 8.6 TABLET, FILM COATED ORAL at 14:54

## 2020-10-16 RX ADMIN — CEFAZOLIN SODIUM 2000 MG: 2 SOLUTION INTRAVENOUS at 09:54

## 2020-10-16 RX ADMIN — INSULIN LISPRO 5 UNITS: 100 INJECTION, SOLUTION INTRAVENOUS; SUBCUTANEOUS at 16:50

## 2020-10-16 RX ADMIN — ROCURONIUM BROMIDE 30 MG: 10 INJECTION, SOLUTION INTRAVENOUS at 10:04

## 2020-10-16 RX ADMIN — MIDAZOLAM 2 MG: 1 INJECTION INTRAMUSCULAR; INTRAVENOUS at 09:44

## 2020-10-16 RX ADMIN — EPHEDRINE SULFATE 10 MG: 50 INJECTION, SOLUTION INTRAVENOUS at 10:55

## 2020-10-16 RX ADMIN — SODIUM CHLORIDE: 0.9 INJECTION, SOLUTION INTRAVENOUS at 12:09

## 2020-10-16 RX ADMIN — PROPOFOL 100 MG: 10 INJECTION, EMULSION INTRAVENOUS at 10:03

## 2020-10-16 RX ADMIN — INSULIN LISPRO 2 UNITS: 100 INJECTION, SOLUTION INTRAVENOUS; SUBCUTANEOUS at 16:50

## 2020-10-16 RX ADMIN — CEFAZOLIN SODIUM 2000 MG: 2 SOLUTION INTRAVENOUS at 17:00

## 2020-10-16 RX ADMIN — ASPIRIN 325 MG ORAL TABLET 325 MG: 325 PILL ORAL at 14:54

## 2020-10-16 RX ADMIN — ACETAMINOPHEN 325 MG: 325 TABLET ORAL at 18:18

## 2020-10-16 RX ADMIN — ROPIVACAINE HYDROCHLORIDE 15 ML: 5 INJECTION, SOLUTION EPIDURAL; INFILTRATION; PERINEURAL at 09:44

## 2020-10-16 RX ADMIN — ROCURONIUM BROMIDE 10 MG: 10 INJECTION, SOLUTION INTRAVENOUS at 10:49

## 2020-10-16 RX ADMIN — INSULIN GLARGINE 4 UNITS: 100 INJECTION, SOLUTION SUBCUTANEOUS at 16:55

## 2020-10-16 RX ADMIN — OXYCODONE HYDROCHLORIDE 5 MG: 5 TABLET ORAL at 20:34

## 2020-10-17 ENCOUNTER — APPOINTMENT (INPATIENT)
Dept: RADIOLOGY | Facility: HOSPITAL | Age: 85
DRG: 483 | End: 2020-10-17
Payer: COMMERCIAL

## 2020-10-17 PROBLEM — E87.1 HYPONATREMIA: Status: ACTIVE | Noted: 2020-10-17

## 2020-10-17 LAB
ANION GAP SERPL CALCULATED.3IONS-SCNC: 7 MMOL/L (ref 4–13)
BUN SERPL-MCNC: 25 MG/DL (ref 5–25)
CALCIUM SERPL-MCNC: 7.5 MG/DL (ref 8.3–10.1)
CHLORIDE SERPL-SCNC: 99 MMOL/L (ref 100–108)
CO2 SERPL-SCNC: 26 MMOL/L (ref 21–32)
CREAT SERPL-MCNC: 1.61 MG/DL (ref 0.6–1.3)
GFR SERPL CREATININE-BSD FRML MDRD: 37 ML/MIN/1.73SQ M
GLUCOSE SERPL-MCNC: 163 MG/DL (ref 65–140)
GLUCOSE SERPL-MCNC: 165 MG/DL (ref 65–140)
GLUCOSE SERPL-MCNC: 177 MG/DL (ref 65–140)
GLUCOSE SERPL-MCNC: 211 MG/DL (ref 65–140)
GLUCOSE SERPL-MCNC: 223 MG/DL (ref 65–140)
GLUCOSE SERPL-MCNC: 261 MG/DL (ref 65–140)
POTASSIUM SERPL-SCNC: 5.1 MMOL/L (ref 3.5–5.3)
SODIUM SERPL-SCNC: 132 MMOL/L (ref 136–145)

## 2020-10-17 PROCEDURE — 80048 BASIC METABOLIC PNL TOTAL CA: CPT | Performed by: ORTHOPAEDIC SURGERY

## 2020-10-17 PROCEDURE — 99232 SBSQ HOSP IP/OBS MODERATE 35: CPT | Performed by: INTERNAL MEDICINE

## 2020-10-17 PROCEDURE — 97530 THERAPEUTIC ACTIVITIES: CPT

## 2020-10-17 PROCEDURE — 94762 N-INVAS EAR/PLS OXIMTRY CONT: CPT

## 2020-10-17 PROCEDURE — NC001 PR NO CHARGE: Performed by: SURGERY

## 2020-10-17 PROCEDURE — 97167 OT EVAL HIGH COMPLEX 60 MIN: CPT

## 2020-10-17 PROCEDURE — 71045 X-RAY EXAM CHEST 1 VIEW: CPT

## 2020-10-17 PROCEDURE — 97163 PT EVAL HIGH COMPLEX 45 MIN: CPT

## 2020-10-17 PROCEDURE — 82948 REAGENT STRIP/BLOOD GLUCOSE: CPT

## 2020-10-17 RX ORDER — INSULIN GLARGINE 100 [IU]/ML
6 INJECTION, SOLUTION SUBCUTANEOUS 2 TIMES DAILY
Status: DISCONTINUED | OUTPATIENT
Start: 2020-10-17 | End: 2020-10-19 | Stop reason: HOSPADM

## 2020-10-17 RX ORDER — HEPARIN SODIUM 5000 [USP'U]/ML
5000 INJECTION, SOLUTION INTRAVENOUS; SUBCUTANEOUS EVERY 8 HOURS SCHEDULED
Status: DISCONTINUED | OUTPATIENT
Start: 2020-10-17 | End: 2020-10-19 | Stop reason: HOSPADM

## 2020-10-17 RX ORDER — INSULIN GLARGINE 100 [IU]/ML
2 INJECTION, SOLUTION SUBCUTANEOUS ONCE
Status: DISCONTINUED | OUTPATIENT
Start: 2020-10-17 | End: 2020-10-17

## 2020-10-17 RX ADMIN — ASPIRIN 325 MG ORAL TABLET 325 MG: 325 PILL ORAL at 08:02

## 2020-10-17 RX ADMIN — SODIUM CHLORIDE 100 ML/HR: 0.9 INJECTION, SOLUTION INTRAVENOUS at 15:51

## 2020-10-17 RX ADMIN — HEPARIN SODIUM 5000 UNITS: 5000 INJECTION INTRAVENOUS; SUBCUTANEOUS at 15:00

## 2020-10-17 RX ADMIN — INSULIN LISPRO 6 UNITS: 100 INJECTION, SOLUTION INTRAVENOUS; SUBCUTANEOUS at 07:56

## 2020-10-17 RX ADMIN — INSULIN LISPRO 1 UNITS: 100 INJECTION, SOLUTION INTRAVENOUS; SUBCUTANEOUS at 07:57

## 2020-10-17 RX ADMIN — DOCUSATE SODIUM 100 MG: 100 CAPSULE, LIQUID FILLED ORAL at 08:02

## 2020-10-17 RX ADMIN — SENNOSIDES 8.6 MG: 8.6 TABLET, FILM COATED ORAL at 08:02

## 2020-10-17 RX ADMIN — INSULIN LISPRO 4 UNITS: 100 INJECTION, SOLUTION INTRAVENOUS; SUBCUTANEOUS at 12:53

## 2020-10-17 RX ADMIN — INSULIN LISPRO 5 UNITS: 100 INJECTION, SOLUTION INTRAVENOUS; SUBCUTANEOUS at 16:46

## 2020-10-17 RX ADMIN — INSULIN LISPRO 1 UNITS: 100 INJECTION, SOLUTION INTRAVENOUS; SUBCUTANEOUS at 12:53

## 2020-10-17 RX ADMIN — INSULIN LISPRO 2 UNITS: 100 INJECTION, SOLUTION INTRAVENOUS; SUBCUTANEOUS at 02:49

## 2020-10-17 RX ADMIN — HEPARIN SODIUM 5000 UNITS: 5000 INJECTION INTRAVENOUS; SUBCUTANEOUS at 21:06

## 2020-10-17 RX ADMIN — ACETAMINOPHEN 325 MG: 325 TABLET ORAL at 02:55

## 2020-10-17 RX ADMIN — INSULIN GLARGINE 6 UNITS: 100 INJECTION, SOLUTION SUBCUTANEOUS at 17:22

## 2020-10-17 RX ADMIN — HEPARIN SODIUM 5000 UNITS: 5000 INJECTION INTRAVENOUS; SUBCUTANEOUS at 10:37

## 2020-10-17 RX ADMIN — SODIUM CHLORIDE 100 ML/HR: 0.9 INJECTION, SOLUTION INTRAVENOUS at 06:29

## 2020-10-17 RX ADMIN — INSULIN LISPRO 1 UNITS: 100 INJECTION, SOLUTION INTRAVENOUS; SUBCUTANEOUS at 16:51

## 2020-10-17 RX ADMIN — INSULIN GLARGINE 6 UNITS: 100 INJECTION, SOLUTION SUBCUTANEOUS at 08:04

## 2020-10-17 RX ADMIN — CEFAZOLIN SODIUM 2000 MG: 2 SOLUTION INTRAVENOUS at 02:51

## 2020-10-18 ENCOUNTER — APPOINTMENT (INPATIENT)
Dept: RADIOLOGY | Facility: HOSPITAL | Age: 85
DRG: 483 | End: 2020-10-18
Payer: COMMERCIAL

## 2020-10-18 PROBLEM — E83.51 HYPOCALCEMIA: Status: ACTIVE | Noted: 2020-10-18

## 2020-10-18 PROBLEM — D64.9 ANEMIA: Status: ACTIVE | Noted: 2020-10-18

## 2020-10-18 PROBLEM — E87.5 HYPERKALEMIA: Status: ACTIVE | Noted: 2020-10-18

## 2020-10-18 LAB
ANION GAP SERPL CALCULATED.3IONS-SCNC: 4 MMOL/L (ref 4–13)
ANION GAP SERPL CALCULATED.3IONS-SCNC: 7 MMOL/L (ref 4–13)
BASOPHILS # BLD AUTO: 0.01 THOUSANDS/ΜL (ref 0–0.1)
BASOPHILS NFR BLD AUTO: 0 % (ref 0–1)
BUN SERPL-MCNC: 18 MG/DL (ref 5–25)
BUN SERPL-MCNC: 24 MG/DL (ref 5–25)
CA-I BLD-SCNC: 1.01 MMOL/L (ref 1.12–1.32)
CALCIUM SERPL-MCNC: 5.1 MG/DL (ref 8.3–10.1)
CALCIUM SERPL-MCNC: 7.6 MG/DL (ref 8.3–10.1)
CHLORIDE SERPL-SCNC: 104 MMOL/L (ref 100–108)
CHLORIDE SERPL-SCNC: 113 MMOL/L (ref 100–108)
CO2 SERPL-SCNC: 20 MMOL/L (ref 21–32)
CO2 SERPL-SCNC: 26 MMOL/L (ref 21–32)
CREAT SERPL-MCNC: 1.05 MG/DL (ref 0.6–1.3)
CREAT SERPL-MCNC: 1.53 MG/DL (ref 0.6–1.3)
EOSINOPHIL # BLD AUTO: 0.08 THOUSAND/ΜL (ref 0–0.61)
EOSINOPHIL NFR BLD AUTO: 1 % (ref 0–6)
ERYTHROCYTE [DISTWIDTH] IN BLOOD BY AUTOMATED COUNT: 14 % (ref 11.6–15.1)
GFR SERPL CREATININE-BSD FRML MDRD: 39 ML/MIN/1.73SQ M
GFR SERPL CREATININE-BSD FRML MDRD: 62 ML/MIN/1.73SQ M
GLUCOSE SERPL-MCNC: 112 MG/DL (ref 65–140)
GLUCOSE SERPL-MCNC: 115 MG/DL (ref 65–140)
GLUCOSE SERPL-MCNC: 157 MG/DL (ref 65–140)
GLUCOSE SERPL-MCNC: 159 MG/DL (ref 65–140)
GLUCOSE SERPL-MCNC: 44 MG/DL (ref 65–140)
GLUCOSE SERPL-MCNC: 81 MG/DL (ref 65–140)
GLUCOSE SERPL-MCNC: 91 MG/DL (ref 65–140)
HCT VFR BLD AUTO: 19 % (ref 36.5–49.3)
HCT VFR BLD AUTO: 20.1 % (ref 36.5–49.3)
HGB BLD-MCNC: 5.8 G/DL (ref 12–17)
HGB BLD-MCNC: 6.1 G/DL (ref 12–17)
IMM GRANULOCYTES # BLD AUTO: 0.04 THOUSAND/UL (ref 0–0.2)
IMM GRANULOCYTES NFR BLD AUTO: 1 % (ref 0–2)
LYMPHOCYTES # BLD AUTO: 0.84 THOUSANDS/ΜL (ref 0.6–4.47)
LYMPHOCYTES NFR BLD AUTO: 15 % (ref 14–44)
MCH RBC QN AUTO: 30.9 PG (ref 26.8–34.3)
MCHC RBC AUTO-ENTMCNC: 29.9 G/DL (ref 31.4–37.4)
MCV RBC AUTO: 104 FL (ref 82–98)
MONOCYTES # BLD AUTO: 0.66 THOUSAND/ΜL (ref 0.17–1.22)
MONOCYTES NFR BLD AUTO: 12 % (ref 4–12)
NEUTROPHILS # BLD AUTO: 3.9 THOUSANDS/ΜL (ref 1.85–7.62)
NEUTS SEG NFR BLD AUTO: 71 % (ref 43–75)
NRBC BLD AUTO-RTO: 0 /100 WBCS
PLATELET # BLD AUTO: 188 THOUSANDS/UL (ref 149–390)
PLATELET # BLD AUTO: 226 THOUSANDS/UL (ref 149–390)
PMV BLD AUTO: 8.5 FL (ref 8.9–12.7)
PMV BLD AUTO: 9 FL (ref 8.9–12.7)
POTASSIUM SERPL-SCNC: 3.2 MMOL/L (ref 3.5–5.3)
POTASSIUM SERPL-SCNC: 4.4 MMOL/L (ref 3.5–5.3)
POTASSIUM SERPL-SCNC: 5.5 MMOL/L (ref 3.5–5.3)
RBC # BLD AUTO: 1.94 MILLION/UL (ref 3.88–5.62)
SARS-COV-2 RNA RESP QL NAA+PROBE: NEGATIVE
SODIUM SERPL-SCNC: 134 MMOL/L (ref 136–145)
SODIUM SERPL-SCNC: 140 MMOL/L (ref 136–145)
WBC # BLD AUTO: 5.53 THOUSAND/UL (ref 4.31–10.16)

## 2020-10-18 PROCEDURE — 82948 REAGENT STRIP/BLOOD GLUCOSE: CPT

## 2020-10-18 PROCEDURE — 71045 X-RAY EXAM CHEST 1 VIEW: CPT

## 2020-10-18 PROCEDURE — 85014 HEMATOCRIT: CPT | Performed by: FAMILY MEDICINE

## 2020-10-18 PROCEDURE — 82330 ASSAY OF CALCIUM: CPT | Performed by: INTERNAL MEDICINE

## 2020-10-18 PROCEDURE — 80048 BASIC METABOLIC PNL TOTAL CA: CPT | Performed by: ORTHOPAEDIC SURGERY

## 2020-10-18 PROCEDURE — 84132 ASSAY OF SERUM POTASSIUM: CPT | Performed by: INTERNAL MEDICINE

## 2020-10-18 PROCEDURE — 99233 SBSQ HOSP IP/OBS HIGH 50: CPT | Performed by: INTERNAL MEDICINE

## 2020-10-18 PROCEDURE — 87635 SARS-COV-2 COVID-19 AMP PRB: CPT | Performed by: PHYSICIAN ASSISTANT

## 2020-10-18 PROCEDURE — 85018 HEMOGLOBIN: CPT | Performed by: FAMILY MEDICINE

## 2020-10-18 PROCEDURE — 94762 N-INVAS EAR/PLS OXIMTRY CONT: CPT

## 2020-10-18 PROCEDURE — 80048 BASIC METABOLIC PNL TOTAL CA: CPT | Performed by: INTERNAL MEDICINE

## 2020-10-18 PROCEDURE — 85025 COMPLETE CBC W/AUTO DIFF WBC: CPT | Performed by: INTERNAL MEDICINE

## 2020-10-18 PROCEDURE — 85049 AUTOMATED PLATELET COUNT: CPT | Performed by: INTERNAL MEDICINE

## 2020-10-18 PROCEDURE — P9016 RBC LEUKOCYTES REDUCED: HCPCS

## 2020-10-18 PROCEDURE — 30233N1 TRANSFUSION OF NONAUTOLOGOUS RED BLOOD CELLS INTO PERIPHERAL VEIN, PERCUTANEOUS APPROACH: ICD-10-PCS | Performed by: FAMILY MEDICINE

## 2020-10-18 PROCEDURE — 99232 SBSQ HOSP IP/OBS MODERATE 35: CPT | Performed by: SURGERY

## 2020-10-18 RX ORDER — ASPIRIN 325 MG
325 TABLET ORAL DAILY
Refills: 0
Start: 2020-10-18

## 2020-10-18 RX ORDER — FUROSEMIDE 10 MG/ML
20 INJECTION INTRAMUSCULAR; INTRAVENOUS ONCE
Status: COMPLETED | OUTPATIENT
Start: 2020-10-18 | End: 2020-10-18

## 2020-10-18 RX ORDER — ACETAMINOPHEN 325 MG/1
325 TABLET ORAL EVERY 4 HOURS PRN
Refills: 0
Start: 2020-10-18

## 2020-10-18 RX ORDER — DOCUSATE SODIUM 100 MG/1
100 CAPSULE, LIQUID FILLED ORAL 2 TIMES DAILY PRN
Qty: 10 CAPSULE | Refills: 0
Start: 2020-10-18

## 2020-10-18 RX ORDER — CALCIUM GLUCONATE 20 MG/ML
1 INJECTION, SOLUTION INTRAVENOUS ONCE
Status: COMPLETED | OUTPATIENT
Start: 2020-10-18 | End: 2020-10-18

## 2020-10-18 RX ADMIN — SODIUM CHLORIDE 100 ML/HR: 0.9 INJECTION, SOLUTION INTRAVENOUS at 00:59

## 2020-10-18 RX ADMIN — INSULIN GLARGINE 6 UNITS: 100 INJECTION, SOLUTION SUBCUTANEOUS at 17:35

## 2020-10-18 RX ADMIN — HEPARIN SODIUM 5000 UNITS: 5000 INJECTION INTRAVENOUS; SUBCUTANEOUS at 06:13

## 2020-10-18 RX ADMIN — ASPIRIN 325 MG ORAL TABLET 325 MG: 325 PILL ORAL at 10:01

## 2020-10-18 RX ADMIN — DOCUSATE SODIUM 100 MG: 100 CAPSULE, LIQUID FILLED ORAL at 17:35

## 2020-10-18 RX ADMIN — DOCUSATE SODIUM 100 MG: 100 CAPSULE, LIQUID FILLED ORAL at 10:01

## 2020-10-18 RX ADMIN — FUROSEMIDE 20 MG: 10 INJECTION, SOLUTION INTRAMUSCULAR; INTRAVENOUS at 11:07

## 2020-10-18 RX ADMIN — INSULIN LISPRO 5 UNITS: 100 INJECTION, SOLUTION INTRAVENOUS; SUBCUTANEOUS at 16:49

## 2020-10-18 RX ADMIN — INSULIN LISPRO 4 UNITS: 100 INJECTION, SOLUTION INTRAVENOUS; SUBCUTANEOUS at 11:41

## 2020-10-18 RX ADMIN — SODIUM CHLORIDE 100 ML/HR: 0.9 INJECTION, SOLUTION INTRAVENOUS at 17:33

## 2020-10-18 RX ADMIN — HEPARIN SODIUM 5000 UNITS: 5000 INJECTION INTRAVENOUS; SUBCUTANEOUS at 21:17

## 2020-10-18 RX ADMIN — SENNOSIDES 8.6 MG: 8.6 TABLET, FILM COATED ORAL at 10:01

## 2020-10-18 RX ADMIN — CALCIUM GLUCONATE 1 G: 20 INJECTION, SOLUTION INTRAVENOUS at 14:21

## 2020-10-19 ENCOUNTER — APPOINTMENT (INPATIENT)
Dept: RADIOLOGY | Facility: HOSPITAL | Age: 85
DRG: 483 | End: 2020-10-19
Payer: COMMERCIAL

## 2020-10-19 VITALS
SYSTOLIC BLOOD PRESSURE: 143 MMHG | DIASTOLIC BLOOD PRESSURE: 64 MMHG | HEIGHT: 70 IN | OXYGEN SATURATION: 98 % | TEMPERATURE: 98 F | BODY MASS INDEX: 25.41 KG/M2 | RESPIRATION RATE: 18 BRPM | WEIGHT: 177.47 LBS | HEART RATE: 80 BPM

## 2020-10-19 LAB
ABO GROUP BLD BPU: NORMAL
ABO GROUP BLD BPU: NORMAL
ANION GAP SERPL CALCULATED.3IONS-SCNC: 6 MMOL/L (ref 4–13)
BPU ID: NORMAL
BPU ID: NORMAL
BUN SERPL-MCNC: 22 MG/DL (ref 5–25)
CA-I BLD-SCNC: 1.01 MMOL/L (ref 1.12–1.32)
CALCIUM SERPL-MCNC: 7.5 MG/DL (ref 8.3–10.1)
CHLORIDE SERPL-SCNC: 103 MMOL/L (ref 100–108)
CO2 SERPL-SCNC: 26 MMOL/L (ref 21–32)
CREAT SERPL-MCNC: 1.26 MG/DL (ref 0.6–1.3)
CROSSMATCH: NORMAL
CROSSMATCH: NORMAL
ERYTHROCYTE [DISTWIDTH] IN BLOOD BY AUTOMATED COUNT: 14.6 % (ref 11.6–15.1)
GFR SERPL CREATININE-BSD FRML MDRD: 50 ML/MIN/1.73SQ M
GLUCOSE SERPL-MCNC: 193 MG/DL (ref 65–140)
GLUCOSE SERPL-MCNC: 224 MG/DL (ref 65–140)
GLUCOSE SERPL-MCNC: 307 MG/DL (ref 65–140)
HCT VFR BLD AUTO: 25.5 % (ref 36.5–49.3)
HGB BLD-MCNC: 8.1 G/DL (ref 12–17)
MCH RBC QN AUTO: 31.5 PG (ref 26.8–34.3)
MCHC RBC AUTO-ENTMCNC: 31.8 G/DL (ref 31.4–37.4)
MCV RBC AUTO: 99 FL (ref 82–98)
PLATELET # BLD AUTO: 240 THOUSANDS/UL (ref 149–390)
PMV BLD AUTO: 8.9 FL (ref 8.9–12.7)
POTASSIUM SERPL-SCNC: 4.8 MMOL/L (ref 3.5–5.3)
RBC # BLD AUTO: 2.57 MILLION/UL (ref 3.88–5.62)
SODIUM SERPL-SCNC: 135 MMOL/L (ref 136–145)
UNIT DISPENSE STATUS: NORMAL
UNIT DISPENSE STATUS: NORMAL
UNIT PRODUCT CODE: NORMAL
UNIT PRODUCT CODE: NORMAL
UNIT RH: NORMAL
UNIT RH: NORMAL
WBC # BLD AUTO: 4.94 THOUSAND/UL (ref 4.31–10.16)

## 2020-10-19 PROCEDURE — 80048 BASIC METABOLIC PNL TOTAL CA: CPT | Performed by: ORTHOPAEDIC SURGERY

## 2020-10-19 PROCEDURE — 82948 REAGENT STRIP/BLOOD GLUCOSE: CPT

## 2020-10-19 PROCEDURE — 97116 GAIT TRAINING THERAPY: CPT

## 2020-10-19 PROCEDURE — 97110 THERAPEUTIC EXERCISES: CPT

## 2020-10-19 PROCEDURE — 97535 SELF CARE MNGMENT TRAINING: CPT

## 2020-10-19 PROCEDURE — 85027 COMPLETE CBC AUTOMATED: CPT | Performed by: INTERNAL MEDICINE

## 2020-10-19 PROCEDURE — 99232 SBSQ HOSP IP/OBS MODERATE 35: CPT | Performed by: SURGERY

## 2020-10-19 PROCEDURE — 82330 ASSAY OF CALCIUM: CPT | Performed by: INTERNAL MEDICINE

## 2020-10-19 PROCEDURE — 71045 X-RAY EXAM CHEST 1 VIEW: CPT

## 2020-10-19 PROCEDURE — 94762 N-INVAS EAR/PLS OXIMTRY CONT: CPT

## 2020-10-19 RX ADMIN — INSULIN LISPRO 4 UNITS: 100 INJECTION, SOLUTION INTRAVENOUS; SUBCUTANEOUS at 11:36

## 2020-10-19 RX ADMIN — SENNOSIDES 8.6 MG: 8.6 TABLET, FILM COATED ORAL at 08:49

## 2020-10-19 RX ADMIN — INSULIN GLARGINE 6 UNITS: 100 INJECTION, SOLUTION SUBCUTANEOUS at 08:49

## 2020-10-19 RX ADMIN — INSULIN LISPRO 6 UNITS: 100 INJECTION, SOLUTION INTRAVENOUS; SUBCUTANEOUS at 07:37

## 2020-10-19 RX ADMIN — INSULIN LISPRO 3 UNITS: 100 INJECTION, SOLUTION INTRAVENOUS; SUBCUTANEOUS at 11:34

## 2020-10-19 RX ADMIN — SODIUM CHLORIDE 100 ML/HR: 0.9 INJECTION, SOLUTION INTRAVENOUS at 03:21

## 2020-10-19 RX ADMIN — HEPARIN SODIUM 5000 UNITS: 5000 INJECTION INTRAVENOUS; SUBCUTANEOUS at 05:17

## 2020-10-19 RX ADMIN — ASPIRIN 325 MG ORAL TABLET 325 MG: 325 PILL ORAL at 08:49

## 2020-10-19 RX ADMIN — DOCUSATE SODIUM 100 MG: 100 CAPSULE, LIQUID FILLED ORAL at 08:52

## 2020-10-19 RX ADMIN — INSULIN LISPRO 2 UNITS: 100 INJECTION, SOLUTION INTRAVENOUS; SUBCUTANEOUS at 07:37

## 2022-02-17 PROCEDURE — 99306 1ST NF CARE HIGH MDM 50: CPT | Performed by: INTERNAL MEDICINE

## 2022-02-21 PROCEDURE — 99309 SBSQ NF CARE MODERATE MDM 30: CPT | Performed by: NURSE PRACTITIONER

## 2022-03-09 PROCEDURE — 99495 TRANSJ CARE MGMT MOD F2F 14D: CPT | Performed by: INTERNAL MEDICINE

## 2022-03-11 PROCEDURE — G0180 MD CERTIFICATION HHA PATIENT: HCPCS | Performed by: INTERNAL MEDICINE

## 2022-04-04 RX ORDER — INSULIN GLARGINE 100 [IU]/ML
INJECTION, SOLUTION SUBCUTANEOUS
Qty: 6 ML | Refills: 11 | Status: SHIPPED | OUTPATIENT
Start: 2022-04-04 | End: 2022-04-27 | Stop reason: ENTERED-IN-ERROR

## 2022-04-26 ENCOUNTER — HOSPITAL ENCOUNTER (INPATIENT)
Facility: HOSPITAL | Age: 86
LOS: 5 days | Discharge: HOME | DRG: 191 | End: 2022-05-02
Attending: EMERGENCY MEDICINE | Admitting: HOSPITALIST
Payer: COMMERCIAL

## 2022-04-26 ENCOUNTER — APPOINTMENT (EMERGENCY)
Dept: RADIOLOGY | Facility: HOSPITAL | Age: 86
DRG: 191 | End: 2022-04-26
Payer: COMMERCIAL

## 2022-04-26 DIAGNOSIS — R73.9 HYPERGLYCEMIA: ICD-10-CM

## 2022-04-26 DIAGNOSIS — R94.31 PROLONGED Q-T INTERVAL ON ECG: ICD-10-CM

## 2022-04-26 DIAGNOSIS — R79.89 ELEVATED TROPONIN: ICD-10-CM

## 2022-04-26 DIAGNOSIS — R05.9 COUGH: ICD-10-CM

## 2022-04-26 DIAGNOSIS — E87.1 HYPONATREMIA: Primary | ICD-10-CM

## 2022-04-26 DIAGNOSIS — R09.02 HYPOXIA: ICD-10-CM

## 2022-04-26 DIAGNOSIS — E87.5 HYPERKALEMIA: ICD-10-CM

## 2022-04-26 LAB
ALBUMIN SERPL-MCNC: 3.4 G/DL (ref 3.4–5)
ALP SERPL-CCNC: 96 IU/L (ref 35–126)
ALT SERPL-CCNC: 14 IU/L (ref 16–63)
ANION GAP SERPL CALC-SCNC: 9 MEQ/L (ref 3–15)
AST SERPL-CCNC: 30 IU/L (ref 15–41)
BASOPHILS # BLD: 0.04 K/UL (ref 0.01–0.1)
BASOPHILS NFR BLD: 0.5 %
BILIRUB DIRECT SERPL-MCNC: 0.4 MG/DL
BILIRUB SERPL-MCNC: 1.6 MG/DL (ref 0.3–1.2)
BUN SERPL-MCNC: 30 MG/DL (ref 8–20)
CALCIUM SERPL-MCNC: 8.4 MG/DL (ref 8.9–10.3)
CHLORIDE SERPL-SCNC: 94 MEQ/L (ref 98–109)
CO2 SERPL-SCNC: 23 MEQ/L (ref 22–32)
CREAT SERPL-MCNC: 1.3 MG/DL (ref 0.8–1.3)
DIFFERENTIAL METHOD BLD: ABNORMAL
EOSINOPHIL # BLD: 0.04 K/UL (ref 0.04–0.54)
EOSINOPHIL NFR BLD: 0.5 %
ERYTHROCYTE [DISTWIDTH] IN BLOOD BY AUTOMATED COUNT: 11.9 % (ref 11.6–14.4)
FLUAV RNA SPEC QL NAA+PROBE: NEGATIVE
FLUBV RNA SPEC QL NAA+PROBE: NEGATIVE
GFR SERPL CREATININE-BSD FRML MDRD: 51.5 ML/MIN/1.73M*2
GLUCOSE SERPL-MCNC: 314 MG/DL (ref 70–99)
HCT VFR BLDCO AUTO: 40.7 % (ref 40.1–51)
HGB BLD-MCNC: 13.4 G/DL (ref 13.7–17.5)
IMM GRANULOCYTES # BLD AUTO: 0.05 K/UL (ref 0–0.08)
IMM GRANULOCYTES NFR BLD AUTO: 0.7 %
LYMPHOCYTES # BLD: 0.78 K/UL (ref 1.2–3.5)
LYMPHOCYTES NFR BLD: 10.2 %
MCH RBC QN AUTO: 30.9 PG (ref 28–33.2)
MCHC RBC AUTO-ENTMCNC: 32.9 G/DL (ref 32.2–36.5)
MCV RBC AUTO: 93.8 FL (ref 83–98)
MONOCYTES # BLD: 0.75 K/UL (ref 0.3–1)
MONOCYTES NFR BLD: 9.8 %
NEUTROPHILS # BLD: 5.97 K/UL (ref 1.7–7)
NEUTS SEG NFR BLD: 78.3 %
NRBC BLD-RTO: 0 %
PDW BLD AUTO: 9.5 FL (ref 9.4–12.4)
PLATELET # BLD AUTO: 249 K/UL (ref 150–350)
POTASSIUM SERPL-SCNC: 5.6 MEQ/L (ref 3.6–5.1)
PROT SERPL-MCNC: 6.7 G/DL (ref 6–8.2)
RBC # BLD AUTO: 4.34 M/UL (ref 4.5–5.8)
RSV RNA SPEC QL NAA+PROBE: NEGATIVE
SARS-COV-2 RNA RESP QL NAA+PROBE: NEGATIVE
SODIUM SERPL-SCNC: 126 MEQ/L (ref 136–144)
TROPONIN I SERPL HS-MCNC: 24.5 PG/ML
WBC # BLD AUTO: 7.63 K/UL (ref 3.8–10.5)

## 2022-04-26 PROCEDURE — 87631 RESP VIRUS 3-5 TARGETS: CPT

## 2022-04-26 PROCEDURE — 99285 EMERGENCY DEPT VISIT HI MDM: CPT | Mod: 25

## 2022-04-26 PROCEDURE — U0002 COVID-19 LAB TEST NON-CDC: HCPCS

## 2022-04-26 PROCEDURE — 82248 BILIRUBIN DIRECT: CPT

## 2022-04-26 PROCEDURE — 25800000 HC PHARMACY IV SOLUTIONS

## 2022-04-26 PROCEDURE — 84484 ASSAY OF TROPONIN QUANT: CPT | Mod: 91

## 2022-04-26 PROCEDURE — 96374 THER/PROPH/DIAG INJ IV PUSH: CPT

## 2022-04-26 PROCEDURE — 25000000 HC PHARMACY GENERAL

## 2022-04-26 PROCEDURE — 71046 X-RAY EXAM CHEST 2 VIEWS: CPT

## 2022-04-26 PROCEDURE — 84484 ASSAY OF TROPONIN QUANT: CPT

## 2022-04-26 PROCEDURE — 36415 COLL VENOUS BLD VENIPUNCTURE: CPT

## 2022-04-26 PROCEDURE — 83930 ASSAY OF BLOOD OSMOLALITY: CPT | Performed by: PHYSICIAN ASSISTANT

## 2022-04-26 PROCEDURE — 93005 ELECTROCARDIOGRAM TRACING: CPT

## 2022-04-26 PROCEDURE — 80053 COMPREHEN METABOLIC PANEL: CPT

## 2022-04-26 PROCEDURE — 85025 COMPLETE CBC W/AUTO DIFF WBC: CPT

## 2022-04-26 PROCEDURE — 63600000 HC DRUGS/DETAIL CODE

## 2022-04-26 RX ORDER — INSULIN ASPART 100 [IU]/ML
4 INJECTION, SOLUTION INTRAVENOUS; SUBCUTANEOUS
Status: ON HOLD | COMMUNITY
End: 2022-05-02 | Stop reason: SDUPTHER

## 2022-04-26 RX ORDER — IPRATROPIUM BROMIDE AND ALBUTEROL SULFATE 2.5; .5 MG/3ML; MG/3ML
3 SOLUTION RESPIRATORY (INHALATION) ONCE
Status: COMPLETED | OUTPATIENT
Start: 2022-04-26 | End: 2022-04-26

## 2022-04-26 RX ADMIN — METHYLPREDNISOLONE SODIUM SUCCINATE 125 MG: 125 INJECTION, POWDER, FOR SOLUTION INTRAMUSCULAR; INTRAVENOUS at 22:36

## 2022-04-26 RX ADMIN — SODIUM CHLORIDE 1000 ML: 9 INJECTION, SOLUTION INTRAVENOUS at 22:54

## 2022-04-26 RX ADMIN — IPRATROPIUM BROMIDE AND ALBUTEROL SULFATE 3 ML: .5; 2.5 SOLUTION RESPIRATORY (INHALATION) at 22:25

## 2022-04-26 ASSESSMENT — ENCOUNTER SYMPTOMS
CHILLS: 0
PALPITATIONS: 0
ABDOMINAL PAIN: 0
BACK PAIN: 0
NECK PAIN: 0
DIARRHEA: 0
COLOR CHANGE: 0
DIZZINESS: 0
FEVER: 0
DIAPHORESIS: 0
NAUSEA: 0
SORE THROAT: 0
HEADACHES: 0
COUGH: 1
RHINORRHEA: 1
SHORTNESS OF BREATH: 0
LIGHT-HEADEDNESS: 0
VOMITING: 0
FATIGUE: 1

## 2022-04-27 ENCOUNTER — APPOINTMENT (INPATIENT)
Dept: RADIOLOGY | Facility: HOSPITAL | Age: 86
DRG: 191 | End: 2022-04-27
Attending: PHYSICIAN ASSISTANT
Payer: COMMERCIAL

## 2022-04-27 PROBLEM — R41.0 DELIRIUM: Status: ACTIVE | Noted: 2022-04-27

## 2022-04-27 PROBLEM — E87.1 HYPONATREMIA: Status: ACTIVE | Noted: 2022-04-27

## 2022-04-27 PROBLEM — R73.9 HYPERGLYCEMIA: Status: ACTIVE | Noted: 2022-04-27

## 2022-04-27 PROBLEM — N18.30 STAGE 3 CHRONIC KIDNEY DISEASE (CMS/HCC): Status: ACTIVE | Noted: 2022-04-27

## 2022-04-27 PROBLEM — Z71.89 ACP (ADVANCE CARE PLANNING): Status: ACTIVE | Noted: 2022-04-27

## 2022-04-27 LAB
ANION GAP SERPL CALC-SCNC: 10 MEQ/L (ref 3–15)
ANION GAP SERPL CALC-SCNC: 11 MEQ/L (ref 3–15)
ANION GAP SERPL CALC-SCNC: 12 MEQ/L (ref 3–15)
ANION GAP SERPL CALC-SCNC: 17 MEQ/L (ref 3–15)
ANION GAP SERPL CALC-SCNC: 9 MEQ/L (ref 3–15)
ATRIAL RATE: 76
BACTERIA URNS QL MICRO: ABNORMAL /HPF
BILIRUB UR QL STRIP.AUTO: NEGATIVE MG/DL
BUN SERPL-MCNC: 33 MG/DL (ref 8–20)
BUN SERPL-MCNC: 36 MG/DL (ref 8–20)
BUN SERPL-MCNC: 42 MG/DL (ref 8–20)
BUN SERPL-MCNC: 46 MG/DL (ref 8–20)
BUN SERPL-MCNC: 47 MG/DL (ref 8–20)
CALCIUM SERPL-MCNC: 7.7 MG/DL (ref 8.9–10.3)
CALCIUM SERPL-MCNC: 8 MG/DL (ref 8.9–10.3)
CALCIUM SERPL-MCNC: 8.1 MG/DL (ref 8.9–10.3)
CALCIUM SERPL-MCNC: 8.2 MG/DL (ref 8.9–10.3)
CALCIUM SERPL-MCNC: 8.3 MG/DL (ref 8.9–10.3)
CHLORIDE SERPL-SCNC: 100 MEQ/L (ref 98–109)
CHLORIDE SERPL-SCNC: 93 MEQ/L (ref 98–109)
CHLORIDE SERPL-SCNC: 95 MEQ/L (ref 98–109)
CHLORIDE SERPL-SCNC: 96 MEQ/L (ref 98–109)
CHLORIDE SERPL-SCNC: 99 MEQ/L (ref 98–109)
CHLORIDE UR-SCNC: <15 MEQ/L
CLARITY UR REFRACT.AUTO: CLEAR
CO2 SERPL-SCNC: 17 MEQ/L (ref 22–32)
CO2 SERPL-SCNC: 19 MEQ/L (ref 22–32)
CO2 SERPL-SCNC: 19 MEQ/L (ref 22–32)
CO2 SERPL-SCNC: 20 MEQ/L (ref 22–32)
CO2 SERPL-SCNC: 20 MEQ/L (ref 22–32)
COLOR UR AUTO: YELLOW
CREAT SERPL-MCNC: 1.4 MG/DL (ref 0.8–1.3)
CREAT SERPL-MCNC: 1.5 MG/DL (ref 0.8–1.3)
CREAT SERPL-MCNC: 1.5 MG/DL (ref 0.8–1.3)
CREAT SERPL-MCNC: 1.6 MG/DL (ref 0.8–1.3)
CREAT SERPL-MCNC: 1.8 MG/DL (ref 0.8–1.3)
GFR SERPL CREATININE-BSD FRML MDRD: 35.4 ML/MIN/1.73M*2
GFR SERPL CREATININE-BSD FRML MDRD: 40.5 ML/MIN/1.73M*2
GFR SERPL CREATININE-BSD FRML MDRD: 43.7 ML/MIN/1.73M*2
GFR SERPL CREATININE-BSD FRML MDRD: 43.7 ML/MIN/1.73M*2
GFR SERPL CREATININE-BSD FRML MDRD: 47.3 ML/MIN/1.73M*2
GLUCOSE BLD-MCNC: 204 MG/DL (ref 70–99)
GLUCOSE BLD-MCNC: 326 MG/DL (ref 70–99)
GLUCOSE BLD-MCNC: 447 MG/DL (ref 70–99)
GLUCOSE BLD-MCNC: 457 MG/DL (ref 70–99)
GLUCOSE BLD-MCNC: 484 MG/DL (ref 70–99)
GLUCOSE BLD-MCNC: 490 MG/DL (ref 70–99)
GLUCOSE BLD-MCNC: 522 MG/DL (ref 70–99)
GLUCOSE BLD-MCNC: 524 MG/DL (ref 70–99)
GLUCOSE BLD-MCNC: 537 MG/DL (ref 70–99)
GLUCOSE BLD-MCNC: 576 MG/DL (ref 70–99)
GLUCOSE BLD-MCNC: 86 MG/DL (ref 70–99)
GLUCOSE SERPL-MCNC: 213 MG/DL (ref 70–99)
GLUCOSE SERPL-MCNC: 390 MG/DL (ref 70–99)
GLUCOSE SERPL-MCNC: 486 MG/DL (ref 70–99)
GLUCOSE SERPL-MCNC: 614 MG/DL (ref 70–99)
GLUCOSE SERPL-MCNC: 619 MG/DL (ref 70–99)
GLUCOSE UR STRIP.AUTO-MCNC: >=1000 MG/DL
HGB UR QL STRIP.AUTO: ABNORMAL
HYALINE CASTS #/AREA URNS LPF: ABNORMAL /LPF
KETONES UR STRIP.AUTO-MCNC: 1 MG/DL
LEUKOCYTE ESTERASE UR QL STRIP.AUTO: NEGATIVE
MUCOUS THREADS URNS QL MICRO: ABNORMAL /LPF
NITRITE UR QL STRIP.AUTO: NEGATIVE
OSMOLALITY SERPL: 294 MOSM/KG (ref 280–300)
OSMOLALITY UR: 574 MOSM/KG (ref 100–1400)
P AXIS: 45
PH UR STRIP.AUTO: 5.5 [PH]
POCT TEST: ABNORMAL
POCT TEST: NORMAL
POTASSIUM SERPL-SCNC: 4 MEQ/L (ref 3.6–5.1)
POTASSIUM SERPL-SCNC: 4.4 MEQ/L (ref 3.6–5.1)
POTASSIUM SERPL-SCNC: 4.9 MEQ/L (ref 3.6–5.1)
POTASSIUM SERPL-SCNC: 5 MEQ/L (ref 3.6–5.1)
POTASSIUM SERPL-SCNC: 5.1 MEQ/L (ref 3.6–5.1)
POTASSIUM UR-SCNC: 35.4 MEQ/L
PR INTERVAL: 148
PROT UR QL STRIP.AUTO: ABNORMAL
QRS DURATION: 148
QT INTERVAL: 444
QTC CALCULATION(BAZETT): 499
R AXIS: -57
RBC #/AREA URNS HPF: ABNORMAL /HPF
SODIUM SERPL-SCNC: 127 MEQ/L (ref 136–144)
SODIUM SERPL-SCNC: 129 MEQ/L (ref 136–144)
SODIUM UR-SCNC: 10 MEQ/L
SP GR UR REFRACT.AUTO: 1.02
SQUAMOUS URNS QL MICRO: ABNORMAL /HPF
T WAVE AXIS: 87
TROPONIN I SERPL HS-MCNC: 20.5 PG/ML
UROBILINOGEN UR STRIP-ACNC: 0.2 EU/DL
VENTRICULAR RATE: 76
WBC #/AREA URNS HPF: ABNORMAL /HPF

## 2022-04-27 PROCEDURE — 63600000 HC DRUGS/DETAIL CODE: Performed by: INTERNAL MEDICINE

## 2022-04-27 PROCEDURE — 25800000 HC PHARMACY IV SOLUTIONS: Performed by: INTERNAL MEDICINE

## 2022-04-27 PROCEDURE — 83735 ASSAY OF MAGNESIUM: CPT | Performed by: HOSPITALIST

## 2022-04-27 PROCEDURE — 81003 URINALYSIS AUTO W/O SCOPE: CPT | Performed by: HOSPITALIST

## 2022-04-27 PROCEDURE — 99223 1ST HOSP IP/OBS HIGH 75: CPT | Performed by: HOSPITALIST

## 2022-04-27 PROCEDURE — G1004 CDSM NDSC: HCPCS

## 2022-04-27 PROCEDURE — 63700000 HC SELF-ADMINISTRABLE DRUG: Performed by: HOSPITALIST

## 2022-04-27 PROCEDURE — 63700000 HC SELF-ADMINISTRABLE DRUG: Performed by: PHYSICIAN ASSISTANT

## 2022-04-27 PROCEDURE — 25000000 HC PHARMACY GENERAL: Performed by: PHYSICIAN ASSISTANT

## 2022-04-27 PROCEDURE — 20600000 HC ROOM AND CARE INTERMEDIATE/TELEMETRY

## 2022-04-27 PROCEDURE — 71250 CT THORAX DX C-: CPT | Mod: ME

## 2022-04-27 PROCEDURE — 81001 URINALYSIS AUTO W/SCOPE: CPT | Performed by: HOSPITALIST

## 2022-04-27 PROCEDURE — 80048 BASIC METABOLIC PNL TOTAL CA: CPT | Performed by: INTERNAL MEDICINE

## 2022-04-27 PROCEDURE — 36415 COLL VENOUS BLD VENIPUNCTURE: CPT | Performed by: PHYSICIAN ASSISTANT

## 2022-04-27 PROCEDURE — 83935 ASSAY OF URINE OSMOLALITY: CPT | Performed by: PHYSICIAN ASSISTANT

## 2022-04-27 PROCEDURE — 25000000 HC PHARMACY GENERAL: Performed by: INTERNAL MEDICINE

## 2022-04-27 PROCEDURE — 25800000 HC PHARMACY IV SOLUTIONS: Performed by: HOSPITALIST

## 2022-04-27 PROCEDURE — 63600000 HC DRUGS/DETAIL CODE: Performed by: HOSPITALIST

## 2022-04-27 PROCEDURE — 99999 PR OFFICE/OUTPT VISIT,PROCEDURE ONLY: CPT | Performed by: HOSPITALIST

## 2022-04-27 PROCEDURE — 80048 BASIC METABOLIC PNL TOTAL CA: CPT | Performed by: PHYSICIAN ASSISTANT

## 2022-04-27 PROCEDURE — 63600000 HC DRUGS/DETAIL CODE: Performed by: PHYSICIAN ASSISTANT

## 2022-04-27 PROCEDURE — 25800000 HC PHARMACY IV SOLUTIONS: Performed by: PHYSICIAN ASSISTANT

## 2022-04-27 PROCEDURE — 82438 ASSAY OTHER FLUID CHLORIDES: CPT | Performed by: PHYSICIAN ASSISTANT

## 2022-04-27 RX ORDER — ATROPINE SULFATE 0.1 MG/ML
0.5 INJECTION INTRAVENOUS EVERY 5 MIN PRN
Status: DISCONTINUED | OUTPATIENT
Start: 2022-04-27 | End: 2022-05-02 | Stop reason: HOSPADM

## 2022-04-27 RX ORDER — DEXTROSE 40 %
15-30 GEL (GRAM) ORAL AS NEEDED
Status: DISCONTINUED | OUTPATIENT
Start: 2022-04-27 | End: 2022-05-02 | Stop reason: HOSPADM

## 2022-04-27 RX ORDER — DEXTROSE 50 % IN WATER (D50W) INTRAVENOUS SYRINGE
25 AS NEEDED
Status: DISCONTINUED | OUTPATIENT
Start: 2022-04-27 | End: 2022-05-02 | Stop reason: HOSPADM

## 2022-04-27 RX ORDER — INSULIN GLARGINE 100 [IU]/ML
4 INJECTION, SOLUTION SUBCUTANEOUS NIGHTLY
Status: DISCONTINUED | OUTPATIENT
Start: 2022-04-27 | End: 2022-04-27

## 2022-04-27 RX ORDER — DEXTROSE 50 % IN WATER (D50W) INTRAVENOUS SYRINGE
10-30 AS NEEDED
Status: DISCONTINUED | OUTPATIENT
Start: 2022-04-27 | End: 2022-04-28

## 2022-04-27 RX ORDER — NITROGLYCERIN 0.4 MG/1
0.4 TABLET SUBLINGUAL EVERY 5 MIN PRN
Status: DISCONTINUED | OUTPATIENT
Start: 2022-04-27 | End: 2022-05-02 | Stop reason: HOSPADM

## 2022-04-27 RX ORDER — GUAIFENESIN 600 MG/1
600 TABLET, EXTENDED RELEASE ORAL 2 TIMES DAILY
Status: DISCONTINUED | OUTPATIENT
Start: 2022-04-27 | End: 2022-04-30

## 2022-04-27 RX ORDER — GUAIFENESIN 600 MG/1
600 TABLET, EXTENDED RELEASE ORAL 2 TIMES DAILY
Status: ON HOLD | COMMUNITY
Start: 2022-04-26 | End: 2022-05-02

## 2022-04-27 RX ORDER — BUDESONIDE 0.5 MG/2ML
0.5 INHALANT ORAL
Status: DISCONTINUED | OUTPATIENT
Start: 2022-04-27 | End: 2022-05-02 | Stop reason: HOSPADM

## 2022-04-27 RX ORDER — ACETAMINOPHEN 325 MG/1
650 TABLET ORAL EVERY 4 HOURS PRN
COMMUNITY

## 2022-04-27 RX ORDER — DOCUSATE SODIUM 100 MG/1
100 CAPSULE, LIQUID FILLED ORAL 2 TIMES DAILY
COMMUNITY

## 2022-04-27 RX ORDER — INSULIN ASPART 100 [IU]/ML
2-12 INJECTION, SOLUTION INTRAVENOUS; SUBCUTANEOUS
Status: DISCONTINUED | OUTPATIENT
Start: 2022-04-27 | End: 2022-04-27

## 2022-04-27 RX ORDER — INSULIN GLARGINE 100 [IU]/ML
4 INJECTION, SOLUTION SUBCUTANEOUS 2 TIMES DAILY
COMMUNITY

## 2022-04-27 RX ORDER — SODIUM CHLORIDE 9 MG/ML
INJECTION, SOLUTION INTRAVENOUS CONTINUOUS
Status: DISCONTINUED | OUTPATIENT
Start: 2022-04-27 | End: 2022-04-27

## 2022-04-27 RX ORDER — ACETAMINOPHEN 325 MG/1
650 TABLET ORAL EVERY 4 HOURS PRN
Status: DISCONTINUED | OUTPATIENT
Start: 2022-04-27 | End: 2022-05-02 | Stop reason: HOSPADM

## 2022-04-27 RX ORDER — IBUPROFEN 200 MG
16-32 TABLET ORAL AS NEEDED
Status: DISCONTINUED | OUTPATIENT
Start: 2022-04-27 | End: 2022-05-02 | Stop reason: HOSPADM

## 2022-04-27 RX ORDER — INSULIN ASPART 100 [IU]/ML
3-5 INJECTION, SOLUTION INTRAVENOUS; SUBCUTANEOUS
Status: DISCONTINUED | OUTPATIENT
Start: 2022-04-27 | End: 2022-04-27

## 2022-04-27 RX ORDER — ASCORBIC ACID 250 MG
250 TABLET ORAL DAILY
COMMUNITY

## 2022-04-27 RX ORDER — PANTOPRAZOLE SODIUM 40 MG/1
40 TABLET, DELAYED RELEASE ORAL DAILY
Status: DISCONTINUED | OUTPATIENT
Start: 2022-04-27 | End: 2022-05-02 | Stop reason: HOSPADM

## 2022-04-27 RX ORDER — BENZONATATE 100 MG/1
100 CAPSULE ORAL 3 TIMES DAILY
Status: DISCONTINUED | OUTPATIENT
Start: 2022-04-27 | End: 2022-05-02 | Stop reason: HOSPADM

## 2022-04-27 RX ORDER — INSULIN GLARGINE 100 [IU]/ML
10 INJECTION, SOLUTION SUBCUTANEOUS ONCE
Status: DISPENSED | OUTPATIENT
Start: 2022-04-27 | End: 2022-04-27

## 2022-04-27 RX ORDER — INSULIN ASPART 100 [IU]/ML
16 INJECTION, SOLUTION INTRAVENOUS; SUBCUTANEOUS ONCE
Status: COMPLETED | OUTPATIENT
Start: 2022-04-27 | End: 2022-04-27

## 2022-04-27 RX ORDER — DEXTROSE MONOHYDRATE AND SODIUM CHLORIDE 5; .9 G/100ML; G/100ML
INJECTION, SOLUTION INTRAVENOUS CONTINUOUS
Status: DISCONTINUED | OUTPATIENT
Start: 2022-04-28 | End: 2022-04-28

## 2022-04-27 RX ORDER — HEPARIN SODIUM 5000 [USP'U]/ML
5000 INJECTION, SOLUTION INTRAVENOUS; SUBCUTANEOUS
Status: DISCONTINUED | OUTPATIENT
Start: 2022-04-27 | End: 2022-05-02 | Stop reason: HOSPADM

## 2022-04-27 RX ORDER — IPRATROPIUM BROMIDE AND ALBUTEROL SULFATE 2.5; .5 MG/3ML; MG/3ML
3 SOLUTION RESPIRATORY (INHALATION) EVERY 6 HOURS
Status: DISCONTINUED | OUTPATIENT
Start: 2022-04-27 | End: 2022-05-02 | Stop reason: HOSPADM

## 2022-04-27 RX ADMIN — SODIUM CHLORIDE 9.54 UNITS/HR: 9 INJECTION, SOLUTION INTRAVENOUS at 16:46

## 2022-04-27 RX ADMIN — SODIUM CHLORIDE: 9 INJECTION, SOLUTION INTRAVENOUS at 02:01

## 2022-04-27 RX ADMIN — DEXTROSE 10 G: 50 INJECTION, SOLUTION INTRAVENOUS at 23:57

## 2022-04-27 RX ADMIN — SODIUM CHLORIDE 500 ML: 9 INJECTION, SOLUTION INTRAVENOUS at 20:14

## 2022-04-27 RX ADMIN — HEPARIN SODIUM 5000 UNITS: 5000 INJECTION, SOLUTION INTRAVENOUS; SUBCUTANEOUS at 09:16

## 2022-04-27 RX ADMIN — HEPARIN SODIUM 5000 UNITS: 5000 INJECTION, SOLUTION INTRAVENOUS; SUBCUTANEOUS at 01:57

## 2022-04-27 RX ADMIN — IPRATROPIUM BROMIDE AND ALBUTEROL SULFATE 3 ML: 2.5; .5 SOLUTION RESPIRATORY (INHALATION) at 12:37

## 2022-04-27 RX ADMIN — HEPARIN SODIUM 5000 UNITS: 5000 INJECTION, SOLUTION INTRAVENOUS; SUBCUTANEOUS at 18:46

## 2022-04-27 RX ADMIN — BENZONATATE 100 MG: 100 CAPSULE ORAL at 18:46

## 2022-04-27 RX ADMIN — INSULIN HUMAN 8 UNITS: 100 INJECTION, SOLUTION PARENTERAL at 09:17

## 2022-04-27 RX ADMIN — BUDESONIDE 0.5 MG: 0.5 INHALANT ORAL at 12:36

## 2022-04-27 RX ADMIN — GUAIFENESIN 600 MG: 600 TABLET ORAL at 21:50

## 2022-04-27 RX ADMIN — BENZONATATE 100 MG: 100 CAPSULE ORAL at 09:14

## 2022-04-27 RX ADMIN — INSULIN ASPART 16 UNITS: 100 INJECTION, SOLUTION INTRAVENOUS; SUBCUTANEOUS at 12:32

## 2022-04-27 RX ADMIN — IPRATROPIUM BROMIDE AND ALBUTEROL SULFATE 3 ML: 2.5; .5 SOLUTION RESPIRATORY (INHALATION) at 01:52

## 2022-04-27 RX ADMIN — BENZONATATE 100 MG: 100 CAPSULE ORAL at 12:37

## 2022-04-27 RX ADMIN — GUAIFENESIN 600 MG: 600 TABLET ORAL at 09:14

## 2022-04-27 RX ADMIN — GUAIFENESIN 600 MG: 600 TABLET ORAL at 01:52

## 2022-04-27 RX ADMIN — DEXTROSE AND SODIUM CHLORIDE: 5; 900 INJECTION, SOLUTION INTRAVENOUS at 23:18

## 2022-04-27 RX ADMIN — PANTOPRAZOLE SODIUM 40 MG: 40 TABLET, DELAYED RELEASE ORAL at 09:14

## 2022-04-27 ASSESSMENT — PATIENT HEALTH QUESTIONNAIRE - PHQ9: SUM OF ALL RESPONSES TO PHQ9 QUESTIONS 1 & 2: 0

## 2022-04-27 NOTE — ASSESSMENT & PLAN NOTE
With hypovolemia    - Corrected for hyperglycemia, Na 129-131   - Mild elevated BUN  - CXR with right-sided pulmonary nodule   - Received IV fluid bolus in the ED    - Continue gentle IV fluid hydration   - Send serum osmolality, urine osmolality, urine electrolytes   - Trend BMP q6h x 24 hours  - Pulm consult given new findings of pulmonary nodule

## 2022-04-27 NOTE — CONSULTS
Endocrinology Consultation    Mr Darron Raman is a 89 year old DM1 with COPD presented to the hospital with shortness of breath of 10 days duration. He was also noted to be hyponatremic and in WIL on arrival. He was given solumedrol 125 mg on arrival. BG spiked to 600s, normal AG but lower bicarb. He was given novolog 16 units and BG now still over 500. He takes lantus 4 units bid and novolog 4 units tid meal plus scale. It appears that he has missed lantus dose yesterday pm and today am. He is on lonny.     He was diagnosed with DM 50 years ago and is followed up by Dr Garnett. He apparently is very good with his DM management and was able to keep his A1c under 7% until recently since he has been managed by caregivers and he was getting his insulin after meals and his A1c was 9.5%. History primarily obtained by his daughter.     He is breathing better now and on room air. He is planned for solumedrol 20 mg q12h.      Wt Readings from Last 3 Encounters:   04/26/22 73.9 kg (163 lb)          Diabetic Complications:   Retinopathy:  yes           Neuropathy yes                   Nephropathy  Yes; GFR 51, Cr 1.3 yesterday          Medical History:   Past Medical History:   Diagnosis Date   • Diabetes mellitus type I (CMS/[facility])        Surgical History: History reviewed. No pertinent surgical history.    Social History:   Social History     Social History Narrative   • Not on file       Family History: History reviewed. No pertinent family history.    Allergies: Patient has no known allergies.      Current Facility-Administered Medications:   •  acetaminophen (TYLENOL) tablet 650 mg, 650 mg, oral, q4h PRN, Sherly Aguirre PA C  •  atropine injection 0.5 mg, 0.5 mg, intravenous, q5 min PRN, Sherly Aguirre PA C  •  azithromycin (ZITHROMAX) IVPB 500 mg in 250 mL NSS vial in bag, 500 mg, intravenous, q24h INT, Sherly Aguirre PA C  •  benzonatate (TESSALON) capsule 100 mg, 100 mg, oral, q6h not 12m, Finn James  MD DK, 100 mg at 04/27/22 1237  •  budesonide (PULMICORT) 0.5 mg/2 mL nebulizer solution 0.5 mg, 0.5 mg, nebulization, BID (6a, 6p), Klaudia Duarte MD, 0.5 mg at 04/27/22 1236  •  glucose chewable tablet 16-32 g of dextrose, 16-32 g of dextrose, oral, PRN **OR** dextrose 40 % oral gel 15-30 g of dextrose, 15-30 g of dextrose, oral, PRN **OR** glucagon (GLUCAGEN) injection 1 mg, 1 mg, intramuscular, PRN **OR** dextrose in water injection 12.5 g, 25 mL, intravenous, PRN, Sherly Aguirre PA C  •  guaiFENesin (MUCINEX) 12 hr ER tablet 600 mg, 600 mg, oral, BID, Sherly Aguirre PA C, 600 mg at 04/27/22 0914  •  heparin (porcine) 5,000 unit/mL injection 5,000 Units, 5,000 Units, subcutaneous, q8h INT, Sherly Aguirre PA C, 5,000 Units at 04/27/22 0916  •  insulin aspart U-100 (NovoLOG) pen 2-12 Units, 2-12 Units, subcutaneous, With meals & nightly, Finn James MD  •  insulin glargine U-100 (LANTUS SOLOSTAR/BASAGLAR) pen 4 Units, 4 Units, subcutaneous, Nightly, Sherly Aguirre PA C  •  ipratropium-albuteroL (DUO-NEB) 0.5-2.5 mg/3 mL nebulizer solution 3 mL, 3 mL, nebulization, q6h NETTIE, Sherly Aguirre PA C, 3 mL at 04/27/22 1237  •  nitroglycerin (NITROSTAT) SL tablet 0.4 mg, 0.4 mg, sublingual, q5 min PRN, Sherly Aguirre PA C  •  pantoprazole (PROTONIX) tablet,delayed release (DR/EC) 40 mg, 40 mg, oral, Daily, Finn James MD, 40 mg at 04/27/22 0914  •  sodium chloride 0.9 % infusion, , intravenous, Continuous, Finn James MD, Last Rate: 80 mL/hr at 04/27/22 1126, Rate Change at 04/27/22 1126    Current Outpatient Medications:   •  insulin aspart U-100 (NovoLOG) 100 unit/mL injection, Inject 4 Units under the skin 3 (three) times a day before meals., Disp: , Rfl:   •  LANTUS SOLOSTAR U-100 INSULIN 100 unit/mL (3 mL) pen, INJECT 12 UNITS SUBCUTANEOUSLY IN THE MORNING AND INJECT 10 UNITS SUBCUTANEOUSLY IN THE EVENING (Patient taking differently: 4 Units 2 (two) times a day.), Disp: 6  mL, Rfl: 11  Home Medications     Medication Sig Dispense Doc. Provider    insulin aspart U-100 (NovoLOG) 100 unit/mL injection Inject 4 Units under the skin 3 (three) times a day before meals.  Provider, MD HAIDER Mcallister U-100 INSULIN 100 unit/mL (3 mL) pen INJECT 12 UNITS SUBCUTANEOUSLY IN THE MORNING AND INJECT 10 UNITS SUBCUTANEOUSLY IN THE EVENING Patient taking differently:  4 Units 2 (two) times a day. 6 mL Sandrine Herr CRNP          Review of Systems  All other systems reviewed and negative except as noted in the HPI.    Objective   Labs  I have reviewed the patient's labs.    Results from last 7 days   Lab Units 04/27/22  1232 04/27/22  0539 04/27/22  0219 04/26/22  2130   SODIUM mEQ/L 127* 127* 127* 126*   POTASSIUM mEQ/L 5.0 5.1 4.9 5.6*   CHLORIDE mEQ/L 99 93* 95* 94*   CO2 mEQ/L 19* 17* 20* 23   BUN mg/dL 42* 36* 33* 30*   CREATININE mg/dL 1.5* 1.5* 1.4* 1.3   CALCIUM mg/dL 7.7* 8.1* 8.0* 8.4*   ALBUMIN g/dL  --   --   --  3.4   BILIRUBIN TOTAL mg/dL  --   --   --  1.6*   ALK PHOS IU/L  --   --   --  96   ALT IU/L  --   --   --  14*   AST IU/L  --   --   --  30   GLUCOSE mg/dL 619* 486* 390* 314*         No results found for: HGBA1C  Lab Results   Component Value Date    CALCIUM 7.7 (L) 04/27/2022             No results found for: HGBA1C  Lab Results   Component Value Date    CREATININE 1.5 (H) 04/27/2022     Estimated Creatinine Clearance: 32.2 mL/min (A) (by C-G formula based on SCr of 1.5 mg/dL (H)).    Imaging  I have reviewed the Imaging from the last 24 hrs.    Physical Exam  Vitals:    04/27/22 1201   BP:    Pulse: 82   Resp: 19   Temp:    SpO2: 93%      General appearance: alert and no distress  Head: normocephalic, without obvious abnormality  Eyes: conjunctiva clear, EOM's intact. Sclerae non icteric  Lungs: clear to auscultation bilaterally  Heart: regular rate and rhythm, S1, S2 normal  Abdomen: soft, non-tender; bowel sounds normal  Extremities: no edema  Skin: No rashes  or lesions  Neurologic: grossly non focal    Assessment/Plan:   Diabetes mellitus: type 1 with steroid induced hyperglycemia. He is admitted with acute bronchitis and was given solumedrol 125 mg on admission. He is planned for 20 mg q12h.     At home, he is on lantus 4 units bid and novolog 4 units tid meal. His A1c was recently 9.5% per pt's daughter; had been under 7% prior to that. He has had DM for 50 years and has been able to manage well until recently. He is on lonny. Sees Dr Garnett as outpt    His BG now in 500s despite novolog correction for 600s. He also missed lantus last night and this am. Will put him on insulin drip and once BG trends to 200s, can transition to basal bolus home regimen.     Will change to clear liquids until his BG trends lower.       Coral Christianson MD

## 2022-04-27 NOTE — ASSESSMENT & PLAN NOTE
- CXR with right-sided pulmonary nodule, no prior imaging for comparison  - Labs significant for hyponatremia  - Ordered CT chest for further evaluation   - Pulm consult

## 2022-04-27 NOTE — CONSULTS
Pulmonology Consult Note    Subjective     Darron Raman is a 89 y.o. male who was admitted for Hyponatremia [E87.1].     He is a 89-year-old man who carries a history of COPD, who presents with cough productive of brownish sputum for approximately 10 days prior to his arrival to the emergency department.  He has had increased dyspnea and wheezing.  Has had sick contacts.  Denies fevers chills.  Tested negative for COVID-19.  In the ED he was found to be hyponatremic and in acute kidney failure with a creatinine of 1.5.    It appears that there was a concern last night that his chest x-ray showed pulmonary nodules so he underwent a CAT scan.  He only has a few tiny pulmonary nodules, but does have very thickened airways consistent with inflammation/bronchitis.    Thus far he has been treated with Solu-Medrol, nebulizers, and azithromycin.  We were consulted for COPD exacerbation/bronchitis.  Unfortunately he refused his insulin overnight, and his blood sugars are extremely high today.  [facility] currently working to get this under control.    He is a poor historian, probably largely due to hearing loss, but his son is at his bedside to provide some additional history.  A viral illness did go through the family.  Other people tested negative for COVID.  He has been sick for approximately 10 days with worsening respiratory symptoms.  This includes cough, wheeze, and sputum production.  He has some shortness of breath.    He does have a prior history of a pneumothorax several years back, but this was iatrogenic and healed on its own.    He quit smoking in 1964.  This is almost 60 years ago.  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  He has a rescue inhaler that he uses rarely and he has some allergies.  This diagnosis of COPD is not confirmed.      Outside records reviewed..    Past Medical History:   Diagnosis Date   • Diabetes mellitus type I (CMS/[facility])      History reviewed. No pertinent  surgical history.  Social History     Socioeconomic History   • Marital status:      Spouse name: None   • Number of children: None   • Years of education: None   • Highest education level: None   Tobacco Use   • Smoking status: Former Smoker   • Smokeless tobacco: Never Used   Substance and Sexual Activity   • Alcohol use: Never   • Drug use: Never     Social Determinants of Health     Food Insecurity: No Food Insecurity   • Worried About Running Out of Food in the Last Year: Never true   • Ran Out of Food in the Last Year: Never true     History reviewed. No pertinent family history.    Allergies: Patient has no known allergies.    Current Inpatient Medications   Medication Dose Route Frequency Provider Last Rate Last Admin   • acetaminophen (TYLENOL) tablet 650 mg  650 mg oral q4h PRN Sherly Aguirre PA C       • atropine injection 0.5 mg  0.5 mg intravenous q5 min PRN Sherly Aguirre PA C       • azithromycin (ZITHROMAX) IVPB 500 mg in 250 mL NSS vial in bag  500 mg intravenous q24h INT Sherly Aguirre PA C       • benzonatate (TESSALON) capsule 100 mg  100 mg oral q6h not 12m Finn James MD   100 mg at 04/27/22 0914   • glucose chewable tablet 16-32 g of dextrose  16-32 g of dextrose oral PRN Sherly Aguirre PA C        Or   • dextrose 40 % oral gel 15-30 g of dextrose  15-30 g of dextrose oral PRN Sherly Aguirre PA C        Or   • glucagon (GLUCAGEN) injection 1 mg  1 mg intramuscular PRN Sherly Aguirre PA C        Or   • dextrose in water injection 12.5 g  25 mL intravenous PRN Sherly Aguirre PA C       • guaiFENesin (MUCINEX) 12 hr ER tablet 600 mg  600 mg oral BID Sherly Aguirre PA C   600 mg at 04/27/22 0914   • heparin (porcine) 5,000 unit/mL injection 5,000 Units  5,000 Units subcutaneous q8h INT Sherly Aguirre PA C   5,000 Units at 04/27/22 0916   • insulin aspart U-100 (NovoLOG) pen 2-12 Units  2-12 Units subcutaneous With meals & nightly Jacob  Finn ROOT MD       • insulin glargine U-100 (LANTUS SOLOSTAR/BASAGLAR) pen 10 Units  10 Units subcutaneous Once Sherly Aguirre PA C       • insulin glargine U-100 (LANTUS SOLOSTAR/BASAGLAR) pen 4 Units  4 Units subcutaneous Nightly Sherly Aguirre PA C       • ipratropium-albuteroL (DUO-NEB) 0.5-2.5 mg/3 mL nebulizer solution 3 mL  3 mL nebulization q6h NETTIE Sherly Aguirre PA C   3 mL at 04/27/22 0152   • nitroglycerin (NITROSTAT) SL tablet 0.4 mg  0.4 mg sublingual q5 min PRN Sherly Aguirre PA C       • pantoprazole (PROTONIX) tablet,delayed release (DR/EC) 40 mg  40 mg oral Daily Finn James MD   40 mg at 04/27/22 0914   • sodium chloride 0.9 % infusion   intravenous Continuous Sherly Aguirre PA C 60 mL/hr at 04/27/22 0201 New Bag at 04/27/22 0201        Medication List      ASK your doctor about these medications    insulin aspart U-100 100 unit/mL injection  Commonly known as: NovoLOG  Inject 4 Units under the skin 3 (three) times a day before meals.  Dose: 4 Units     LANTUS SOLOSTAR U-100 INSULIN 100 unit/mL (3 mL) pen  INJECT 12 UNITS SUBCUTANEOUSLY IN THE MORNING AND INJECT 10 UNITS SUBCUTANEOUSLY IN THE EVENING  Generic drug: insulin glargine U-100            Review of Systems:  All other systems reviewed and negative except as noted in the HPI.    Objective     Vital Signs for the last 24 hours:  Temp:  [36.8 °C (98.3 °F)-37.2 °C (99 °F)] 37.2 °C (99 °F)  Heart Rate:  [77-96] 87  Resp:  [18-20] 18  BP: (129-153)/(58-77) 149/77  SpO2:  [92 %-99 %] 93 %    Oxygen:  Oxygen Therapy: None (Room air)              Labs:  White count is normal at 7.6.  Creatinine was 1.3 on arrival and is up to 1.5 this morning.  Highly sensitive troponin is minimally elevated at 20.5.    Imaging:  I have reviewed the Imaging from the last 24 hrs. described above in HPI.      Physical Exam:  HEENT:  Normocephalic, atraumatic  Eyes:  Sclera anicteric, conjunctiva pink, pupils equal and reactive to  light  Neck: no adenopathy, no JVD  Lungs: Rhonchi with expiratory wheezing, very wet cough  Cor:  RRR normal S1 and S2, no murmurs, gallops or rubs  Abd: Soft Nontender, nondistended, normal bowel sounds  Extremities: no Cyanosis, clubbing, edema  Skin: no rash, no skin breakdown  Neuro: alert and oriented, no focal deficits, except hearing loss  Psych: normal affect, good eye contact  Joints: no deformities, no warmth or erythema      Assessment   89 y.o. male being consulted for acute bronchitis with a asthma exacerbation.  Likely viral trigger, sick contacts.  COVID-negative.  At this point may have developed a secondary bacterial bronchitis.  Has minimal and insignificant pulmonary nodules, particularly at his advanced age.    Plan     -Would resume Solu-Medrol but will treat with lower doses, 20 mg every 12 given his very elevated blood sugars.  I d/w Dr. James and he would like to avoid it to see if blood sugar can come under better control first.  I will add budesonide to nebs BID.  -May require insulin drip for his blood sugars if they do not come under better control.  Unfortunately refused a dose of his insulin last night, and now his blood sugars have been running around 500.  Insulin just given.  Discussed with Dr. James.  -Standing nebulizers every 6 hours  -Continue azithromycin for 5 days total is reasonable  -Sputum culture if he is able to produce.  -DVT prophylaxis with subcu heparin    Thank you for this consultation, will follow    70 minutes were spent obtaining a detailed interval history, detailed exam, evaluating this high complexity case with significant medical decision making and coordination of care with primary team and consultants.

## 2022-04-27 NOTE — ED ATTESTATION NOTE
Procedures  Physical Exam  Review of Systems    4/26/20229:57 PM  I have personally seen and examined the patient.  I reviewed and agree with the PA/NP/Resident's assessment and plan of care.    My examination, assessment, and plan of care of Darron Raman is as follows:  The patient presents with shortness of breath cough and congestion no chest pain no fever seen by urgent care for up to the emergency department  Exam: Awake mildly dyspneic heart rate regular with frequent ectopic beats are noted tachycardic lungs reveal some inspiratory crepitance in the bases with some scattered rare wheezes prolonged expiratory phase suggestive of active lung disease.  Patient was given an inhaler by his primary care physician however has not been using it he has not had any increasing purulence to with sputum according to his family there has been a cold running through the entire family  Impression/Plan: Hyponatremia acute exacerbation COPD mild hypoxemia right pulmonary nodule    Vital Signs Review: Vital signs have been reviewed. The oxygen saturation is  SpO2: 92 % which is mild hypoxia.    I was physically present for the key/critical portions of the following procedures: None    This document was created using dragon dictation software.  There might be some typographical errors due to this technology.    We are in a pandemic with increased volumes and decreased capacity.      Roman Pitts,   04/26/22 1169

## 2022-04-27 NOTE — H&P
Hospital Medicine Service -  History & Physical        CHIEF COMPLAINT     Chief Complaint   Patient presents with   • Cough        HISTORY OF PRESENT ILLNESS      89 y.o. male with a past medical history of COPD, HTN, HLD, T1DM, CKD, LBBB that presents for cough.  Patient is a poor historian.  Patient obtained by ED staff.  He reports productive cough with brown sputum x 1.5 weeks.  He reports stuffy nose, which was improved by Mucienex.  He reports increased dyspnea and wheezing.  He reports fatigue.  He reports sick contact with family.  He denies fever, chills, lightheadedness, dizziness, chest pain, palpitations, abdominal pain, vomiting, diarrhea, change in PO intake, dysuria, LE edema.     ED: VSS.  Lab workup with hyponatremia, hyperkalemia, elevated BUN, hyperglycemia, elevated T bili, elevated HS Trop.  Flu, RSV, COVID-negative.  CXR preliminary with right-sided pulmonary nodules.  Patient received IV Solu-Medrol, Nebs and IV fluid in the ED. CT chest ordered and pending.  He will also be started on IV Azithromycin.  Pulmonology consulted.    PAST MEDICAL AND SURGICAL HISTORY      Past Medical History:   Diagnosis Date   • Diabetes mellitus type I (CMS/[facility])        History reviewed. No pertinent surgical history.    MEDICATIONS      Prior to Admission medications    Medication Sig Start Date End Date Taking? Authorizing Provider   insulin aspart U-100 (NovoLOG) 100 unit/mL injection Inject 4 Units under the skin 3 (three) times a day before meals.   Yes Provider, MD HAIDER Mcallister U-100 INSULIN 100 unit/mL (3 mL) pen INJECT 12 UNITS SUBCUTANEOUSLY IN THE MORNING AND INJECT 10 UNITS SUBCUTANEOUSLY IN THE EVENING  Patient taking differently: 4 Units 2 (two) times a day. 4/4/22   Sandrine Herr CRNP       ALLERGIES      Patient has no known allergies.    FAMILY HISTORY      History reviewed. No pertinent family history.    SOCIAL HISTORY      Social History     Socioeconomic History   •  Marital status:      Spouse name: None   • Number of children: None   • Years of education: None   • Highest education level: None   Tobacco Use   • Smoking status: Former Smoker   • Smokeless tobacco: Never Used   Substance and Sexual Activity   • Alcohol use: Never   • Drug use: Never     Social Determinants of Health     Food Insecurity: No Food Insecurity   • Worried About Running Out of Food in the Last Year: Never true   • Ran Out of Food in the Last Year: Never true       REVIEW OF SYSTEMS        Review of Systems  All others reviewed and negative     PHYSICAL EXAMINATION      Temp:  [36.8 °C (98.3 °F)-37 °C (98.6 °F)] 36.8 °C (98.3 °F)  Heart Rate:  [77-96] 96  Resp:  [18-20] 18  BP: (129-153)/(58-73) 153/73  Body mass index is 22.73 kg/m².    General exam: appears age stated, well nourished, not in distress  Head: atraumatic, normocephalic  Eyes: PERRLA, EOMI, no pallor, no icterus  ENT: no lesions, oropharynx pink, mucous membranes moist   Neck: supple, no Lymph nodes, no Thyromegaly, no JVD   CVS: normal rate, normal rhythm, S1 and S2 heard, no murmurs, rubs or gallops  Resp: normal accessory muscle usage, clear to auscultation bilaterally but poor inspiratory effort, +productive cough  Abdomen: soft, Nt, BS +, no organomegaly   Extremities: trace-1+ edema, no cyanosis   MSK: no DJD, no joint swellings, no joint tenderness   Skin: intact, warm, no rash  Neuro: AAO x3, CN 2-12 intact,  motor strength in all extremities, sensations, DTRs, coordination intact.  Psych: normal mood, cooperative      LABS / IMAGING / EKG        Labs  CBC Results       04/26/22     2130    WBC 7.63    RBC 4.34    HGB 13.4    HCT 40.7    MCV 93.8    MCH 30.9    MCHC 32.9            CMP Results       04/27/22 04/26/22     0219 2130     126    K 4.9 5.6    Cl 95 94    CO2 20 23    Glucose 390 314    BUN 33 30    Creatinine 1.4 1.3    Calcium 8.0 8.4    Anion Gap 12 9    AST -- 30    ALT -- 14    Albumin -- 3.4     EGFR 47.3 51.5         Comment for K at 0219 on 04/27/22: Results obtained on plasma. Plasma Potassium values may be up to 0.4 mEQ/L less than serum values. The differences may be greater for patients with high platelet or white cell counts.    Comment for K at 2130 on 04/26/22: Results obtained on plasma. Plasma Potassium values may be up to 0.4 mEQ/L less than serum values. The differences may be greater for patients with high platelet or white cell counts.  SLIGHT HEMOLYSIS, RESULT MAY BE INCREASED.    Comment for AST at 2130 on 04/26/22: MODERATE HEMOLYSIS, RESULT MAY BE INCREASED.    Comment for ALT at 2130 on 04/26/22: MODERATE HEMOLYSIS, RESULT MAY BE INCREASED.          Troponin I Results       04/26/22 04/26/22 2333 2130    HS Troponin I 20.5 24.5        Microbiology Results     Procedure Component Value Units Date/Time    SARS-CoV-2 (COVID-19), PCR Nasopharynx [[id number]]  (Normal) Collected: 04/26/22 2135    Specimen: Nasopharyngeal Swab from Nasopharynx Updated: 04/26/22 2206    Narrative:      The following orders were created for panel order SARS-CoV-2 (COVID-19), PCR Nasopharynx.  Procedure                               Abnormality         Status                     ---------                               -----------         ------                     SARS-CoV-2 (COVID-19), P...[[id number]]  Normal              Final result                 Please view results for these tests on the individual orders.    SARS-CoV-2 (COVID-19), PCR Nasopharynx [[id number]]  (Normal) Collected: 04/26/22 2135    Specimen: Nasopharyngeal Swab from Nasopharynx Updated: 04/26/22 2206     SARS-CoV-2 (COVID-19) Negative    RSV and Influenza Nucleic Acids, PCR Nasopharynx [[id number]]  (Normal) Collected: 04/26/22 2135    Specimen: Nasopharyngeal Swab from Nasopharynx Updated: 04/26/22 2326     Influenza A Negative     Influenza B Negative     Respiratory Syncytial Virus Negative        UA Results    No lab values to  display.         Imaging  X-RAY CHEST 2 VIEWS   ED Interpretation   Interpreted with Dr. Pitts. Pulmonary nodule in R lung.      ECG 12 lead, If upper abdominal pain and age of 40 or higher   ED Interpretation   Interpreted with Dr Pitts  Rhythm: sinus with PVCs  Rate: 76  P wave interval: 148  QRS: 148  Axis: LAD  QT/QTC: 444/499  St: No stemi  No acute ischemia  LBBB            CT CHEST WITHOUT IV CONTRAST    (Results Pending)         ECG/Telemetry  Reviewed by me    ASSESSMENT AND PLAN           * Hyponatremia  Assessment & Plan  - Corrected for hyperglycemia, Na 129-131   - Mild elevated BUN  - CXR with right-sided pulmonary nodule   - Received IV fluid bolus in the ED    - Continue gentle IV fluid hydration   - Send serum osmolality, urine osmolality, urine electrolytes   - Trend BMP q6h x 24 hours  - Pulm consult given new findings of pulmonary nodule    COPD (chronic obstructive pulmonary disease) (CMS/[facility])  Assessment & Plan  - Presents with dyspnea, productive cough, wheezing x 1.5 weeks  - CXR with right-sided pulmonary nodule, no obvious infiltrate  - Received IV Solumedrol, Nebs, IV fluid bolus in ED     - Continue O2 to maintain saturations > 88%  - Continue gentle IV fluid hydration  - Start IV Azithromycin   - Continue IV Solumedrol   - Supportive measures to include Nebs, Mucinex  - Ordered CT chest as below  - Pulm consult        Pulmonary nodule  Assessment & Plan  - CXR with right-sided pulmonary nodule, no prior imaging for comparison  - Labs significant for hyponatremia  - Ordered CT chest for further evaluation   - Pulm consult       HLD (hyperlipidemia)  Assessment & Plan  - Not on home regimen    Diabetes mellitus type I (CMS/[facility])  Assessment & Plan  - BS elevated, ordered Lantus 10 units  - Continued on Lantus 4 units nightly   - Accu-checks with SSI    Stage 3 chronic kidney disease (CMS/HCC)  Assessment & Plan  - Renal function stable at baseline      VTE Assessment: Padua    VTE  Prophylaxis Plan: SQ Heparin   Code Status: Full Code       CARMEN George  4/27/2022

## 2022-04-27 NOTE — PROGRESS NOTES
Hospital Medicine Service -  Daily Progress Note       SUBJECTIVE     Interval History: No acute events overnight. Awake, following, could not tell year, President Name. The Daughter, POA bedside, Updated. She told me that patient is DNR Code Status.    No focal sign. No SOB. Occasional dry cough.  RA SaO2 94%     OBJECTIVE        Vital signs in last 24 hours:  Temp:  [36.8 °C (98.3 °F)-37.2 °C (99 °F)] 37.2 °C (99 °F)  Heart Rate:  [77-96] 90  Resp:  [18-20] 18  BP: (129-153)/(58-77) 129/60  No intake/output data recorded.    PHYSICAL EXAMINATION        GEN: well-developed and well-nourished; not in acute distress  HEENT: normocephalic; atraumatic  NECK: no JVD; no bruits  CARDIO: regular rate and rhythm; no murmurs or rubs  RESP: clear to auscultation bilaterally; no rales, rhonchi, or wheezes  ABD: soft, non-distended, non-tender, normal bowel sounds  EXT: no cyanosis, clubbing, or edema  SKIN: clean, dry, warm, and intact  MUSCULOSKELETAL: no injury or deformity  NEURO: alert and not oriented x2(Time, P); nonfocal, Following  BEHAVIOR/EMOTIONAL: appropriate; cooperative     LABS / IMAGING / TELE        Labs  Lab Results   Component Value Date    WBC 7.63 04/26/2022    HGB 13.4 (L) 04/26/2022    HCT 40.7 04/26/2022    MCV 93.8 04/26/2022     04/26/2022     Lab Results   Component Value Date    GLUCOSE 619 (HH) 04/27/2022    CALCIUM 7.7 (L) 04/27/2022     (L) 04/27/2022    K 5.0 04/27/2022    CO2 19 (L) 04/27/2022    CL 99 04/27/2022    BUN 42 (H) 04/27/2022    CREATININE 1.5 (H) 04/27/2022     No results found for: INR, PROTIME    Imaging  X-RAY CHEST 2 VIEWS    Result Date: 4/27/2022  IMPRESSION:  No evidence of active cardiopulmonary disease. Large lung volumes concerning for COPD. COMPARISON: None  available. COMMENT:  PA and lateral views of the chest are completed. Large lung volumes noted with some flattening hemidiaphragms, prominent retrosternal airspace, concerning for COPD. There is no  consolidation or pleural fluid. No pleural air. Right nipple shadow noted. Normal pulmonary vascularity and cardiac size noted. Mild atheromatous disease in the thoracic aorta. Unremarkable hilar and mediastinal contours. The imaged upper abdomen is unremarkable. There are diffuse spondylitic changes through the mid lower dorsal spine. Reverse right total shoulder arthroplasty noted, with long intramedullary benji.     CT CHEST WITHOUT IV CONTRAST    Result Date: 4/27/2022  IMPRESSION: 1.  Few small pulmonary nodules measuring up to 4 mm. 2.  Diffuse peribronchial thickening. 3.  Cardiomegaly.  Small pericardial effusion. 4.  Findings suggestive of medullary nephrocalcinosis. -------------------------------------------------------------------------------- ---------------- Fleischner Society 2017 Guidelines for Management of Incidentally Detected Pulmonary Nodules in Adults. (Solid Nodules) Nodule size < 6 mm (multiple) Low-Risk Patient - No routine follow-up High-Risk Patient - Optional CT at 12 months -------------------------------------------------------------------------------- ----------------      ECG/Telemetry  I have independently reviewed the telemetry. No events for the last 24 hours.    ASSESSMENT AND PLAN      ACP (advance care planning)  Assessment & Plan  D/w patient and his POA Daughter bedside: DNR    Delirium  Assessment & Plan  Agitated, confused this am  Now more coherent. Calm  Check UA  Avoiding Steroid  supportive care    Pulmonary nodule  Assessment & Plan  - CXR with right-sided pulmonary nodule, no prior imaging for comparison  - Labs significant for hyponatremia  - Ordered CT chest for further evaluation   - Pulm consult       COPD (chronic obstructive pulmonary disease) (CMS/[facility])  Assessment & Plan  - Presents with dyspnea, productive cough, wheezing x 1.5 weeks  - CXR with right-sided pulmonary nodule, no obvious infiltrate  - Received IV Solumedrol, Nebs, IV fluid bolus in ED     -  Continue O2 to maintain saturations > 88%  - Continue gentle IV fluid hydration  - Start IV Azithromycin   - Continue IV Solumedrol   - Supportive measures to include Nebs, Mucinex  - Ordered CT chest as below  - Pulm consult    4/27  SaO2 94% RA  Hold Steroid  Breathing Tx        HLD (hyperlipidemia)  Assessment & Plan  - Not on home regimen    Diabetes mellitus type I (CMS/HCC)  Assessment & Plan  - BS elevated, ordered Lantus 10 units  - Continued on Lantus 4 units nightly   - Accu-checks with SSI    4/27  Refusing Insulin Lantus   Confused.  D/w RN, will try to give Insulin soon    BS >500  With Na 127  Consult Endo. Recommending Insulin drip   Transfer to PCU    Stage 3 chronic kidney disease (CMS/[facility])  Assessment & Plan  - Renal function stable at baseline    * Hyponatremia  Assessment & Plan  - Corrected for hyperglycemia, Na 129-131   - Mild elevated BUN  - CXR with right-sided pulmonary nodule   - Received IV fluid bolus in the ED    - Continue gentle IV fluid hydration   - Send serum osmolality, urine osmolality, urine electrolytes   - Trend BMP q6h x 24 hours  - Pulm consult given new findings of pulmonary nodule         VTE Assessment: Padua    Code Status: Full Code  Estimated discharge date:      Finn James MD  4/27/2022  3:21 PM

## 2022-04-27 NOTE — ED NOTES
"Nurse notified [facility] provider Jacob that pt's blood was continuing to increase and nurse requested an insulin drip since the SubQ insulin was not working.  Nurse did not receive any other insulin orders from Jacob, however Jacob put in an edocrinology consult.  While awaiting the 2 hours for endocrinology to consult pt's family yelled at nurse that this was unacceptable and asked, \"Is anyone going to do anything for my dad's sugar?\"  Nurse informed pt's daughter that pt is awaiting an endocrinology consult and nothing else has been ordered at this time.  Dr. Christianson finally saw pt and stated, \"this pt needs an insulin drip.\"  Nurse informed Dr. Ruth that I've been asking Dr. James for 2 hours for insulin drip.  Nurse then saw the insulin order for pt but drip was not the normal DKA, HHS order with titratable numbers.  Nurse attempted to verify order with pharmacy and consulted with 4 other nurses and none of the other ED nurses had seen this type of insulin drip order.  Nurse then spoke with Dr. James again and asked him to review insulin drip orders and Dr James stated to nurse, \"ask Dr. Ruth, she wrote the order.\"  Pharmacist Estelle then attempted to page Dr. Christianson for insulin order clarifications and Dr. Christianson stated in secure chat, \"this is Dr. James's pt,\" and then proceeded to leave the chat.  Charge nurse contacted SCOTTY Mistry to ask for clarification and Fede was able to give guidance on the \"software calculated insulin order,\"  This resulted in a delay of care for the pt before he was able to receive his insulin drip, meanwhile the family continued to be angry with nurse and ask why the insulin hasn't been started.  After approximately 3+ hours insulin drip able to be started.      Mel Shipman, RN  04/27/22 1922    "

## 2022-04-27 NOTE — ED PROVIDER NOTES
Emergency Medicine Note  HPI   HISTORY OF PRESENT ILLNESS       History provided by:  Patient and medical records (and son)   used: No      89-year-old male with history of HTN, LBBB, COPD, T1DM, stage III CKD, and HLD presents to the ED via private vehicle for evaluation of cough x1.5 weeks.  Majority of the history is provided by the patient's son.  Patient is currently residing at [facility] for rehab.  He has been having persistent cough, fatigue, congestion, and rhinorrhea.  He tried Mucinex, which seemed to help.  Patient denies shortness of breath.  According to son, there has been a cold going around the family.  Patient was prescribed an inhaler, however he hardly uses it.  Denies fever, chest pain, leg swelling, palpitations, sore throat, abdominal pain, vomiting, headache, lightheadedness, dizziness, rash.      Patient History   PAST HISTORY     Reviewed from Nursing Triage:         Past Medical History:   Diagnosis Date   • Diabetes mellitus type I (CMS/[facility])        History reviewed. No pertinent surgical history.    History reviewed. No pertinent family history.    Social History     Tobacco Use   • Smoking status: Former Smoker   • Smokeless tobacco: Never Used   Substance Use Topics   • Alcohol use: Never   • Drug use: Never         Review of Systems   REVIEW OF SYSTEMS     Review of Systems   Constitutional: Positive for fatigue. Negative for chills, diaphoresis and fever.   HENT: Positive for congestion and rhinorrhea. Negative for sore throat.    Respiratory: Positive for cough. Negative for shortness of breath.    Cardiovascular: Negative for chest pain, palpitations and leg swelling.   Gastrointestinal: Negative for abdominal pain, diarrhea, nausea and vomiting.   Musculoskeletal: Negative for back pain and neck pain.   Skin: Negative for color change and rash.   Neurological: Negative for dizziness, syncope, light-headedness and headaches.         VITALS     ED Vitals     Date/Time Temp Pulse Resp BP SpO2 [facility]   04/26/22 2315 -- 94 18 129/58 94 % SDB   04/26/22 2245 -- -- -- -- 99 % SES   04/26/22 2119 37 °C (98.6 °F) 77 20 148/71 92 % HAB        Pulse Ox %: 92 % (04/26/22 2123)  Pulse Ox Interpretation: Normal (low end of normal) (04/26/22 2123)  Heart Rate: 77 (04/26/22 2123)  Rhythm Strip Interpretation: Other (see comments) (Sinus rhythm with PVCs) (04/26/22 2323)     Physical Exam   PHYSICAL EXAM     Physical Exam  Vitals and nursing note reviewed.   Constitutional:       General: He is not in acute distress.  HENT:      Head: Normocephalic and atraumatic.      Mouth/Throat:      Mouth: Mucous membranes are moist.      Pharynx: Oropharynx is clear.      Comments: Dentures  Eyes:      General: No scleral icterus.     Extraocular Movements: Extraocular movements intact.      Conjunctiva/sclera: Conjunctivae normal.      Pupils: Pupils are equal, round, and reactive to light.   Cardiovascular:      Rate and Rhythm: Normal rate and regular rhythm.      Pulses: Normal pulses.      Heart sounds: Normal heart sounds. No murmur heard.  Pulmonary:      Effort: No respiratory distress.      Breath sounds: No stridor. No rales.      Comments: Rhonchorous cough.  Mild expiratory wheezing. Speaking in full sentences. Mild tachypnea.  Abdominal:      General: Bowel sounds are normal. There is no distension.      Palpations: Abdomen is soft.      Tenderness: There is no abdominal tenderness. There is no guarding or rebound.   Musculoskeletal:         General: No swelling or tenderness. Normal range of motion.      Cervical back: Normal range of motion and neck supple. No rigidity.      Right lower leg: No edema.      Left lower leg: No edema.   Skin:     General: Skin is warm and dry.      Capillary Refill: Capillary refill takes less than 2 seconds.      Coloration: Skin is not pale.      Findings: No rash.   Neurological:      General: No focal deficit present.      Mental Status: He is  alert and oriented to person, place, and time.   Psychiatric:         Mood and Affect: Mood normal.         Behavior: Behavior normal.         Thought Content: Thought content normal.           PROCEDURES     Procedures     DATA     Results     Procedure Component Value Units Date/Time    RSV and Influenza Nucleic Acids, PCR Nasopharynx [[id number]]  (Normal) Collected: 04/26/22 2135    Specimen: Nasopharyngeal Swab from Nasopharynx Updated: 04/26/22 2326     Influenza A Negative     Influenza B Negative     Respiratory Syncytial Virus Negative    Hepatic function panel [[id number]]  (Abnormal) Collected: 04/26/22 2130    Specimen: Blood, Venous Updated: 04/26/22 2252     Albumin 3.4 g/dL      Bilirubin, Total 1.6 mg/dL      Comment: MODERATE HEMOLYSIS, RESULT MAY BE INCREASED.        Bilirubin, Direct 0.4 mg/dL      Comment: MODERATE HEMOLYSIS, RESULT MAY BE AFFECTED.        Alkaline Phosphatase 96 IU/L      AST (SGOT) 30 IU/L      Comment: MODERATE HEMOLYSIS, RESULT MAY BE INCREASED.        ALT (SGPT) 14 IU/L      Comment: MODERATE HEMOLYSIS, RESULT MAY BE INCREASED.        Total Protein 6.7 g/dL     Basic metabolic panel [[id number]]  (Abnormal) Collected: 04/26/22 2130    Specimen: Blood, Venous Updated: 04/26/22 2217     Sodium 126 mEQ/L      Potassium 5.6 mEQ/L      Comment: Results obtained on plasma. Plasma Potassium values may be up to 0.4 mEQ/L less than serum values. The differences may be greater for patients with high platelet or white cell counts.  SLIGHT HEMOLYSIS, RESULT MAY BE INCREASED.        Chloride 94 mEQ/L      CO2 23 mEQ/L      BUN 30 mg/dL      Creatinine 1.3 mg/dL      Glucose 314 mg/dL      Calcium 8.4 mg/dL      eGFR 51.5 mL/min/1.73m*2      Anion Gap 9 mEQ/L     HS Troponin I (with 2 hour reflex) [[id number]]  (Abnormal) Collected: 04/26/22 2130    Specimen: Blood, Venous Updated: 04/26/22 2217     High Sens Troponin I 24.5 pg/mL     SARS-CoV-2 (COVID-19), PCR Nasopharynx [[id number]]   (Normal) Collected: 04/26/22 2135    Specimen: Nasopharyngeal Swab from Nasopharynx Updated: 04/26/22 2206    Narrative:      The following orders were created for panel order SARS-CoV-2 (COVID-19), PCR Nasopharynx.  Procedure                               Abnormality         Status                     ---------                               -----------         ------                     SARS-CoV-2 (COVID-19), P...[[id number]]  Normal              Final result                 Please view results for these tests on the individual orders.    SARS-CoV-2 (COVID-19), PCR Nasopharynx [[id number]]  (Normal) Collected: 04/26/22 2135    Specimen: Nasopharyngeal Swab from Nasopharynx Updated: 04/26/22 2206     SARS-CoV-2 (COVID-19) Negative    CBC and differential [[id number]]  (Abnormal) Collected: 04/26/22 2130    Specimen: Blood, Venous Updated: 04/26/22 2147     WBC 7.63 K/uL      RBC 4.34 M/uL      Hemoglobin 13.4 g/dL      Hematocrit 40.7 %      MCV 93.8 fL      MCH 30.9 pg      MCHC 32.9 g/dL      RDW 11.9 %      Platelets 249 K/uL      MPV 9.5 fL      Differential Type Auto     nRBC 0.0 %      Immature Granulocytes 0.7 %      Neutrophils 78.3 %      Lymphocytes 10.2 %      Monocytes 9.8 %      Eosinophils 0.5 %      Basophils 0.5 %      Immature Granulocytes, Absolute 0.05 K/uL      Neutrophils, Absolute 5.97 K/uL      Lymphocytes, Absolute 0.78 K/uL      Monocytes, Absolute 0.75 K/uL      Eosinophils, Absolute 0.04 K/uL      Basophils, Absolute 0.04 K/uL     RAINBOW LT BLUE [[id number]] Collected: 04/26/22 2130    Specimen: Blood, Venous Updated: 04/26/22 2140    RAINBOW GOLD [[id number]] Collected: 04/26/22 2130    Specimen: Blood, Venous Updated: 04/26/22 2140    [place] Draw Panel [[id number]] Collected: 04/26/22 2130    Specimen: Blood, Venous Updated: 04/26/22 2140    Narrative:      The following orders were created for panel order [place] Draw Panel.  Procedure                               Abnormality          Status                     ---------                               -----------         ------                     RAINBOW RED[[id number]]                                      In process                 RAINBOW LT BLUE[[id number]]                                  In process                 RAINBOW GOLD[[id number]]                                     In process                   Please view results for these tests on the individual orders.    RAINBOW RED [[id number]] Collected: 04/26/22 2130    Specimen: Blood, Venous Updated: 04/26/22 2140          Imaging Results          X-RAY CHEST 2 VIEWS (Preliminary result)  Result time 04/26/22 22:30:19    ED Interpretation    Interpreted with Dr. Pitts. Pulmonary nodule in R lung.                              ECG 12 lead, If upper abdominal pain and age of 40 or higher   ED Interpretation   Interpreted with Dr Pitts  Rhythm: sinus with PVCs  Rate: 76  P wave interval: 148  QRS: 148  Axis: LAD  QT/QTC: 444/499  St: No stemi  No acute ischemia  LBBB          Scoring tools                                 ED Course & MDM   MDM / ED COURSE / CLINICAL IMPRESSIONS / DISPO     MDM    ED Course as of 04/27/22 0011 Tue Apr 26, 2022 2151 Impression: Cough x 1.5 weeks    Plan: Labs, Trop, COVID, EKG, CXR [SG]   2218 Sodium(!): 126  Corrected sodium 129 [SG]   2230 X-RAY CHEST 2 VIEWS  Reviewed with Dr. Pitts. Pulmonary nodule in R lung [SG]   2314 Patient feels improved following duo-neb. Will recheck troponin [SG]   2325 [facility] paged for admission [SG]   2352 Case was discussed with hospitalist.  Reviewed patient's presentation, ED course, and relevant data.  Hospitalist accepts patient on their service and will see / admit pt. [SG]      ED Course User Index  [SG] Emmie Stephenson PA C         Clinical Impressions as of 04/27/22 0011   Hyponatremia   Hyperkalemia   Hypoxia   Cough   Elevated troponin   Hyperglycemia              Emmie Stephenson PA C  04/27/22 0012

## 2022-04-27 NOTE — ASSESSMENT & PLAN NOTE
- Presents with dyspnea, productive cough, wheezing x 1.5 weeks  - CXR with right-sided pulmonary nodule, no obvious infiltrate  - Received IV Solumedrol, Nebs, IV fluid bolus in ED     - Continue O2 to maintain saturations > 88%  - Continue gentle IV fluid hydration  - Start IV Azithromycin   - Continue IV Solumedrol   - Supportive measures to include Nebs, Mucinex  - Ordered CT chest as below  - Pulm consult    4/27  SaO2 92% RA  Hold Steroid  Breathing Tx

## 2022-04-27 NOTE — ASSESSMENT & PLAN NOTE
- BS elevated, ordered Lantus 10 units  - Continued on Lantus 4 units nightly   - Accu-checks with SSI    4/27  Refusing Insulin Lantus   Confused.  D/w RN, will try to give Insulin soon    BS >500  With Na 127  Consult Endo. Recommending Insulin drip   Transfer to PCU

## 2022-04-28 PROBLEM — J20.9 ACUTE BRONCHITIS: Status: ACTIVE | Noted: 2022-04-28

## 2022-04-28 LAB
ANION GAP SERPL CALC-SCNC: 13 MEQ/L (ref 3–15)
BUN SERPL-MCNC: 47 MG/DL (ref 8–20)
CALCIUM SERPL-MCNC: 8.4 MG/DL (ref 8.9–10.3)
CHLORIDE SERPL-SCNC: 100 MEQ/L (ref 98–109)
CO2 SERPL-SCNC: 19 MEQ/L (ref 22–32)
CREAT SERPL-MCNC: 1.4 MG/DL (ref 0.8–1.3)
ERYTHROCYTE [DISTWIDTH] IN BLOOD BY AUTOMATED COUNT: 12 % (ref 11.6–14.4)
GFR SERPL CREATININE-BSD FRML MDRD: 47.3 ML/MIN/1.73M*2
GLUCOSE BLD-MCNC: 138 MG/DL (ref 70–99)
GLUCOSE BLD-MCNC: 168 MG/DL (ref 70–99)
GLUCOSE BLD-MCNC: 187 MG/DL (ref 70–99)
GLUCOSE BLD-MCNC: 247 MG/DL (ref 70–99)
GLUCOSE BLD-MCNC: 348 MG/DL (ref 70–99)
GLUCOSE BLD-MCNC: 369 MG/DL (ref 70–99)
GLUCOSE BLD-MCNC: 404 MG/DL (ref 70–99)
GLUCOSE BLD-MCNC: 56 MG/DL (ref 70–99)
GLUCOSE BLD-MCNC: 73 MG/DL (ref 70–99)
GLUCOSE BLD-MCNC: 74 MG/DL (ref 70–99)
GLUCOSE BLD-MCNC: 77 MG/DL (ref 70–99)
GLUCOSE BLD-MCNC: 81 MG/DL (ref 70–99)
GLUCOSE BLD-MCNC: 98 MG/DL (ref 70–99)
GLUCOSE SERPL-MCNC: 262 MG/DL (ref 70–99)
HCT VFR BLDCO AUTO: 36.6 % (ref 40.1–51)
HGB BLD-MCNC: 11.7 G/DL (ref 13.7–17.5)
MAGNESIUM SERPL-MCNC: 2.1 MG/DL (ref 1.8–2.5)
MAGNESIUM SERPL-MCNC: 2.2 MG/DL (ref 1.8–2.5)
MCH RBC QN AUTO: 30.2 PG (ref 28–33.2)
MCHC RBC AUTO-ENTMCNC: 32 G/DL (ref 32.2–36.5)
MCV RBC AUTO: 94.6 FL (ref 83–98)
PDW BLD AUTO: 9.8 FL (ref 9.4–12.4)
PLATELET # BLD AUTO: 258 K/UL (ref 150–350)
POCT TEST: ABNORMAL
POCT TEST: NORMAL
POTASSIUM SERPL-SCNC: 4.9 MEQ/L (ref 3.6–5.1)
RBC # BLD AUTO: 3.87 M/UL (ref 4.5–5.8)
SODIUM SERPL-SCNC: 132 MEQ/L (ref 136–144)
WBC # BLD AUTO: 11.52 K/UL (ref 3.8–10.5)

## 2022-04-28 PROCEDURE — 63600000 HC DRUGS/DETAIL CODE: Performed by: PHYSICIAN ASSISTANT

## 2022-04-28 PROCEDURE — 85027 COMPLETE CBC AUTOMATED: CPT | Performed by: HOSPITALIST

## 2022-04-28 PROCEDURE — 25800000 HC PHARMACY IV SOLUTIONS: Performed by: PHYSICIAN ASSISTANT

## 2022-04-28 PROCEDURE — 94640 AIRWAY INHALATION TREATMENT: CPT

## 2022-04-28 PROCEDURE — 99233 SBSQ HOSP IP/OBS HIGH 50: CPT | Performed by: FAMILY MEDICINE

## 2022-04-28 PROCEDURE — 63700000 HC SELF-ADMINISTRABLE DRUG: Performed by: PHYSICIAN ASSISTANT

## 2022-04-28 PROCEDURE — 20600000 HC ROOM AND CARE INTERMEDIATE/TELEMETRY

## 2022-04-28 PROCEDURE — 63600000 HC DRUGS/DETAIL CODE: Performed by: HOSPITALIST

## 2022-04-28 PROCEDURE — 63700000 HC SELF-ADMINISTRABLE DRUG: Performed by: INTERNAL MEDICINE

## 2022-04-28 PROCEDURE — 63600000 HC DRUGS/DETAIL CODE: Performed by: FAMILY MEDICINE

## 2022-04-28 PROCEDURE — 63700000 HC SELF-ADMINISTRABLE DRUG: Performed by: HOSPITALIST

## 2022-04-28 PROCEDURE — 83735 ASSAY OF MAGNESIUM: CPT | Performed by: HOSPITALIST

## 2022-04-28 PROCEDURE — 25000000 HC PHARMACY GENERAL: Performed by: PHYSICIAN ASSISTANT

## 2022-04-28 PROCEDURE — 36415 COLL VENOUS BLD VENIPUNCTURE: CPT | Performed by: HOSPITALIST

## 2022-04-28 PROCEDURE — 80048 BASIC METABOLIC PNL TOTAL CA: CPT | Performed by: HOSPITALIST

## 2022-04-28 PROCEDURE — 25000000 HC PHARMACY GENERAL: Performed by: HOSPITALIST

## 2022-04-28 PROCEDURE — 25000000 HC PHARMACY GENERAL: Performed by: INTERNAL MEDICINE

## 2022-04-28 RX ORDER — INSULIN ASPART 100 [IU]/ML
6 INJECTION, SOLUTION INTRAVENOUS; SUBCUTANEOUS
Status: DISCONTINUED | OUTPATIENT
Start: 2022-04-28 | End: 2022-04-29

## 2022-04-28 RX ORDER — DEXTROSE 50 % IN WATER (D50W) INTRAVENOUS SYRINGE
25 ONCE
Status: COMPLETED | OUTPATIENT
Start: 2022-04-28 | End: 2022-04-28

## 2022-04-28 RX ORDER — INSULIN GLARGINE 100 [IU]/ML
4 INJECTION, SOLUTION SUBCUTANEOUS NIGHTLY
Status: DISCONTINUED | OUTPATIENT
Start: 2022-04-28 | End: 2022-04-28

## 2022-04-28 RX ORDER — INSULIN ASPART 100 [IU]/ML
2-10 INJECTION, SOLUTION INTRAVENOUS; SUBCUTANEOUS
Status: DISCONTINUED | OUTPATIENT
Start: 2022-04-28 | End: 2022-04-28

## 2022-04-28 RX ORDER — INSULIN GLARGINE 100 [IU]/ML
4 INJECTION, SOLUTION SUBCUTANEOUS EVERY MORNING
Status: DISCONTINUED | OUTPATIENT
Start: 2022-04-29 | End: 2022-04-28

## 2022-04-28 RX ORDER — INSULIN GLARGINE 100 [IU]/ML
6 INJECTION, SOLUTION SUBCUTANEOUS NIGHTLY
Status: DISCONTINUED | OUTPATIENT
Start: 2022-04-29 | End: 2022-04-29

## 2022-04-28 RX ORDER — INSULIN GLARGINE 100 [IU]/ML
4 INJECTION, SOLUTION SUBCUTANEOUS ONCE
Status: COMPLETED | OUTPATIENT
Start: 2022-04-28 | End: 2022-04-28

## 2022-04-28 RX ORDER — INSULIN GLARGINE 100 [IU]/ML
6 INJECTION, SOLUTION SUBCUTANEOUS ONCE
Status: COMPLETED | OUTPATIENT
Start: 2022-04-28 | End: 2022-04-28

## 2022-04-28 RX ORDER — INSULIN GLARGINE 100 [IU]/ML
6 INJECTION, SOLUTION SUBCUTANEOUS EVERY MORNING
Status: DISCONTINUED | OUTPATIENT
Start: 2022-04-29 | End: 2022-04-29

## 2022-04-28 RX ORDER — INSULIN ASPART 100 [IU]/ML
0-6 INJECTION, SOLUTION INTRAVENOUS; SUBCUTANEOUS
Status: DISCONTINUED | OUTPATIENT
Start: 2022-04-28 | End: 2022-04-29

## 2022-04-28 RX ORDER — INSULIN GLARGINE 100 [IU]/ML
4 INJECTION, SOLUTION SUBCUTANEOUS ONCE
Status: DISCONTINUED | OUTPATIENT
Start: 2022-04-28 | End: 2022-04-28

## 2022-04-28 RX ORDER — INSULIN GLARGINE 100 [IU]/ML
4 INJECTION, SOLUTION SUBCUTANEOUS NIGHTLY
Status: DISCONTINUED | OUTPATIENT
Start: 2022-04-29 | End: 2022-04-28

## 2022-04-28 RX ORDER — INSULIN ASPART 100 [IU]/ML
4 INJECTION, SOLUTION INTRAVENOUS; SUBCUTANEOUS
Status: DISCONTINUED | OUTPATIENT
Start: 2022-04-28 | End: 2022-04-28

## 2022-04-28 RX ADMIN — IPRATROPIUM BROMIDE AND ALBUTEROL SULFATE 3 ML: 2.5; .5 SOLUTION RESPIRATORY (INHALATION) at 07:48

## 2022-04-28 RX ADMIN — BENZONATATE 100 MG: 100 CAPSULE ORAL at 04:53

## 2022-04-28 RX ADMIN — GUAIFENESIN 600 MG: 600 TABLET ORAL at 08:26

## 2022-04-28 RX ADMIN — INSULIN ASPART 6 UNITS: 100 INJECTION, SOLUTION INTRAVENOUS; SUBCUTANEOUS at 13:07

## 2022-04-28 RX ADMIN — INSULIN GLARGINE 4 UNITS: 100 INJECTION, SOLUTION SUBCUTANEOUS at 09:57

## 2022-04-28 RX ADMIN — INSULIN GLARGINE 6 UNITS: 100 INJECTION, SOLUTION SUBCUTANEOUS at 17:15

## 2022-04-28 RX ADMIN — BUDESONIDE 0.5 MG: 0.5 INHALANT ORAL at 20:02

## 2022-04-28 RX ADMIN — IPRATROPIUM BROMIDE AND ALBUTEROL SULFATE 3 ML: 2.5; .5 SOLUTION RESPIRATORY (INHALATION) at 20:02

## 2022-04-28 RX ADMIN — METHYLPREDNISOLONE SODIUM SUCCINATE 30 MG: 40 INJECTION, POWDER, FOR SOLUTION INTRAMUSCULAR; INTRAVENOUS at 20:28

## 2022-04-28 RX ADMIN — BUDESONIDE 0.5 MG: 0.5 INHALANT ORAL at 07:43

## 2022-04-28 RX ADMIN — DEXTROSE 12.5 G: 50 INJECTION, SOLUTION INTRAVENOUS at 02:11

## 2022-04-28 RX ADMIN — BENZONATATE 100 MG: 100 CAPSULE ORAL at 17:14

## 2022-04-28 RX ADMIN — BENZONATATE 100 MG: 100 CAPSULE ORAL at 11:34

## 2022-04-28 RX ADMIN — INSULIN ASPART 1 UNITS: 100 INJECTION, SOLUTION INTRAVENOUS; SUBCUTANEOUS at 17:14

## 2022-04-28 RX ADMIN — INSULIN ASPART 4 UNITS: 100 INJECTION, SOLUTION INTRAVENOUS; SUBCUTANEOUS at 13:07

## 2022-04-28 RX ADMIN — GUAIFENESIN 600 MG: 600 TABLET ORAL at 20:28

## 2022-04-28 RX ADMIN — HEPARIN SODIUM 5000 UNITS: 5000 INJECTION, SOLUTION INTRAVENOUS; SUBCUTANEOUS at 20:28

## 2022-04-28 RX ADMIN — IPRATROPIUM BROMIDE AND ALBUTEROL SULFATE 3 ML: 2.5; .5 SOLUTION RESPIRATORY (INHALATION) at 14:59

## 2022-04-28 RX ADMIN — INSULIN ASPART 10 UNITS: 100 INJECTION, SOLUTION INTRAVENOUS; SUBCUTANEOUS at 08:26

## 2022-04-28 RX ADMIN — HEPARIN SODIUM 5000 UNITS: 5000 INJECTION, SOLUTION INTRAVENOUS; SUBCUTANEOUS at 11:35

## 2022-04-28 RX ADMIN — AZITHROMYCIN MONOHYDRATE 500 MG: 500 INJECTION, POWDER, LYOPHILIZED, FOR SOLUTION INTRAVENOUS at 01:20

## 2022-04-28 RX ADMIN — PANTOPRAZOLE SODIUM 40 MG: 40 TABLET, DELAYED RELEASE ORAL at 08:26

## 2022-04-28 RX ADMIN — INSULIN ASPART 6 UNITS: 100 INJECTION, SOLUTION INTRAVENOUS; SUBCUTANEOUS at 17:14

## 2022-04-28 RX ADMIN — HEPARIN SODIUM 5000 UNITS: 5000 INJECTION, SOLUTION INTRAVENOUS; SUBCUTANEOUS at 02:12

## 2022-04-28 RX ADMIN — IPRATROPIUM BROMIDE AND ALBUTEROL SULFATE 3 ML: 2.5; .5 SOLUTION RESPIRATORY (INHALATION) at 03:33

## 2022-04-28 NOTE — PLAN OF CARE
Plan of Care Review  Plan of Care Reviewed With: patient  Progress: improving  Outcome Summary: Pt admitted from ED at shift change with blood glucose in the 400s. Pt was ordered drug calculated insulin gtt. One time 500 mL NS bolus ordered. Blood glucose trended down significantly over the next few hours and pt became hypoglecemic at 56. Insulin gtt has been off since 2355. D50 amp given x2. IVF changed from NS to D5/NS at 60 mL/hr.  Blood glucose is now currently > 200, [facility] notified. Continuing with IV abx. Pt also has dry, non productive cough. Tessalon, mucinex and duonebs ordered. IV steroids on hold at this time. Pt AAOx4, but forgetful.

## 2022-04-28 NOTE — ASSESSMENT & PLAN NOTE
- UA negative for infection   - Exacerbated by steroids.   - Plan on quick taper   - Avoid QT prolonging medications

## 2022-04-28 NOTE — PROGRESS NOTES
Endocrinology Progress Note    Darron Raman   [id number]   12/1/1928   Admission date: 4/26/2022    Interval history: eating ok. Denies nausea, abdominal pain. Sleeping comfortably. Checked BG with lonny 314 now     Reviewed blood glucose   Latest Reference Range & Units 04/28/22 02:00 04/28/22 03:31 04/28/22 04:38 04/28/22 06:19 04/28/22 08:23 04/28/22 12:22   POCT Bedside Glucose 70 - 99 mg/dL 98 [1] 187 (H) [2] 247 (H) [3] 348 (H) [4] 369 (H) [5] 404 (H) [6]   (H): Data is abnormally high  [1] : TAVIA FLOWERS  [2] : TAVIA FLOWERS  [3] : TAVIA FLOWERS  [4] : TAVIA FLOWERS  [5] : SASHA MARTINEZ (PCT)  [6] : SASHA MARTINEZ (PCT)    Current Facility-Administered Medications:   •  acetaminophen (TYLENOL) tablet 650 mg, 650 mg, oral, q4h PRN, Sherly Aguirre PA C  •  atropine injection 0.5 mg, 0.5 mg, intravenous, q5 min PRN, Sherly Aguirre PA C  •  azithromycin (ZITHROMAX) IVPB 500 mg in 250 mL NSS vial in bag, 500 mg, intravenous, q24h INT, Sherly Aguirre PA C, Stopped at 04/28/22 0226  •  benzonatate (TESSALON) capsule 100 mg, 100 mg, oral, q6h not 12m, Finn James MD, 100 mg at 04/28/22 1134  •  budesonide (PULMICORT) 0.5 mg/2 mL nebulizer solution 0.5 mg, 0.5 mg, nebulization, BID (6a, 6p), Klaudia Duarte MD, 0.5 mg at 04/28/22 0743  •  glucose chewable tablet 16-32 g of dextrose, 16-32 g of dextrose, oral, PRN **OR** dextrose 40 % oral gel 15-30 g of dextrose, 15-30 g of dextrose, oral, PRN **OR** glucagon (GLUCAGEN) injection 1 mg, 1 mg, intramuscular, PRN **OR** dextrose in water injection 12.5 g, 25 mL, intravenous, PRN, Sherly Aguirre PA C  •  guaiFENesin (MUCINEX) 12 hr ER tablet 600 mg, 600 mg, oral, BID, Sherly Aguirre PA C, 600 mg at 04/28/22 0826  •  heparin (porcine) 5,000 unit/mL injection 5,000 Units, 5,000 Units, subcutaneous, q8h INT, Sherly Aguirre PA C, 5,000 Units at 04/28/22 1135  •   insulin aspart U-100 (NovoLOG) pen 0-6 Units, 0-6 Units, subcutaneous, With meals & nightly, Coral Christianson MD, 6 Units at 04/28/22 1307  •  insulin aspart U-100 (NovoLOG) pen 4 Units, 4 Units, subcutaneous, TID with meals, Coral Christianson MD, 4 Units at 04/28/22 1307  •  [START ON 4/29/2022] insulin glargine U-100 (LANTUS SOLOSTAR/BASAGLAR) pen 4 Units, 4 Units, subcutaneous, q AM, Coral Christianson MD  •  [START ON 4/29/2022] insulin glargine U-100 (LANTUS SOLOSTAR/BASAGLAR) pen 4 Units, 4 Units, subcutaneous, Nightly, Coral Christianson MD  •  insulin glargine U-100 (LANTUS SOLOSTAR/BASAGLAR) pen 4 Units, 4 Units, subcutaneous, Once, Coral Christianson MD  •  ipratropium-albuteroL (DUO-NEB) 0.5-2.5 mg/3 mL nebulizer solution 3 mL, 3 mL, nebulization, q6h NETTIE, Sherly Aguirre PA C, 3 mL at 04/28/22 0748  •  nitroglycerin (NITROSTAT) SL tablet 0.4 mg, 0.4 mg, sublingual, q5 min PRN, Sherly Aguirre PA C  •  pantoprazole (PROTONIX) tablet,delayed release (DR/EC) 40 mg, 40 mg, oral, Daily, Finn James MD, 40 mg at 04/28/22 0826    Labs  I have reviewed the patient's labs.   Results from last 7 days   Lab Units 04/28/22  0445 04/27/22  2201 04/27/22  1746 04/27/22  0219 04/26/22  2130   SODIUM mEQ/L 132* 129* 127*   < > 126*   POTASSIUM mEQ/L 4.9 4.0 4.4   < > 5.6*   CHLORIDE mEQ/L 100 100 96*   < > 94*   CO2 mEQ/L 19* 19* 20*   < > 23   BUN mg/dL 47* 46* 47*   < > 30*   CREATININE mg/dL 1.4* 1.6* 1.8*   < > 1.3   CALCIUM mg/dL 8.4* 8.3* 8.2*   < > 8.4*   ALBUMIN g/dL  --   --   --   --  3.4   BILIRUBIN TOTAL mg/dL  --   --   --   --  1.6*   ALK PHOS IU/L  --   --   --   --  96   ALT IU/L  --   --   --   --  14*   AST IU/L  --   --   --   --  30   GLUCOSE mg/dL 262* 213* 614*   < > 314*    < > = values in this interval not displayed.         No results found for: HGBA1C  Lab Results   Component Value Date    CALCIUM 8.4 (L) 04/28/2022             No results found for: HGBA1C  Lab Results    Component Value Date    CREATININE 1.4 (H) 04/28/2022     Estimated Creatinine Clearance: 34.9 mL/min (A) (by C-G formula based on SCr of 1.4 mg/dL (H)).    Imaging  I have reviewed the Imaging from the last 24 hrs.    Physical Exam  Vitals:    04/28/22 1200   BP:    Pulse: 88   Resp: 16   Temp:    SpO2: 95%            Assessment/Plan:      Diabetes mellitus: type 1 with steroid induced hyperglycemia. He is admitted with acute bronchitis and was given solumedrol 125 mg on admission.      At home, he is on lantus 4 units bid and novolog 4 units tid meal. His A1c was recently 9.5% per pt's daughter; had been under 7% prior to that. He has had DM for 50 years and has been able to manage well until recently. He is on lonny. Sees Dr Garnett as outpt. He has lonny.    He was placed on insulin drip yesterday since his BG had spiked to 600s. He also had missed 2 doses of lantus. Insulin drip was stopped last afternoon when his BG dropped to the 50s. He was then started on D5. BG luis a to 200s. Did not get lantus until this am at 10 am when he got 4 units. He is eating well. Will give him another dose of lantus 6 units now. Increase novolog to 6 units tid meal and continue scale. Resume lantus 6 units bid tomorrow; may need to adjust doses.    Pulmonary has strongly recommended to resume solumedrol at 30 mg q12h. Spoke to RN, page me when steroids are started. Will increase lantus and novolog and if BG are very uncontrolled despite that, will switch to suri Christianson MD

## 2022-04-28 NOTE — STUDENT
"  Medical Student Note: For educational purposes only.  Do not use for coding or billing.     Medical Student Daily Progress Note    Subjective     Interval History: Overnight had a hypoglycemic event (BGL 56), D50 ampx2 administered; IFV changed to D5/NS 60mL/hr. BGL this morning are elevated, D5/NS on hold.     This morning patient is doing well,  up in bed eating breakfast.   He has concern about his broke hearing aid but states his \"hearing and cough has improved since I've been here.\" He also endorses a productive cough, at times is able to clear sputum.  has no complaint of CP, SOB, N/V, HA, fevers, chills.       Objective     Vital signs in last 24 hours:  Temp:  [36.4 °C (97.5 °F)-36.8 °C (98.3 °F)] 36.8 °C (98.2 °F)  Heart Rate:  [70-95] 95  Resp:  [18-20] 18  BP: (118-155)/() 150/64      Intake/Output Summary (Last 24 hours) at 4/28/2022 1032  Last data filed at 4/28/2022 0600  Gross per 24 hour   Intake 2627.67 ml   Output 400 ml   Net 2227.67 ml     Intake/Output this shift:  No intake/output data recorded.    PHYSICL EXAM  Gen: well developed, well nourished, appears stated age, not in any distress  Head: atraumatic, normocephalic  Eyes:  PERRLA, EOMI, no pallor, no scleral icterus   Ears: no lesions,  (+) hearing aids  NT: (+) dentures  Neck: supple, no adenopathy, no JVD  CVS: RRR, No M/G, no JVD  Pulm: poor inspiratory effort, + wheezing b/l, + rhonci, + productive cough  Abd: Soft, NT, ND, normal bowel sounds  Extremities:no edema, no cyanosis   Skin: intact, warm, no rashes  Neuro: AAO x3      Lines, Drains, Airways, Wounds:  Peripheral IV (Adult) 04/27/22 Left;Posterior Forearm (Active)   Number of days: 1       Peripheral IV (Adult) 04/28/22 Anterior;Right Forearm (Active)   Number of days: 0       Labs  Results from last 7 days   Lab Units 04/28/22  0445   WBC K/uL 11.52*   HEMOGLOBIN g/dL 11.7*   HEMATOCRIT % 36.6*   PLATELETS K/uL 258     Results from last 7 days   Lab Units " 04/28/22  0445   SODIUM mEQ/L 132*   POTASSIUM mEQ/L 4.9   CHLORIDE mEQ/L 100   CO2 mEQ/L 19*   BUN mg/dL 47*   CREATININE mg/dL 1.4*   GLUCOSE mg/dL 262*   CALCIUM mg/dL 8.4*         Micro  Microbiology Results     Procedure Component Value Units Date/Time    SARS-CoV-2 (COVID-19), PCR Nasopharynx [[id number]]  (Normal) Collected: 04/26/22 2135    Specimen: Nasopharyngeal Swab from Nasopharynx Updated: 04/26/22 2206    Narrative:      The following orders were created for panel order SARS-CoV-2 (COVID-19), PCR Nasopharynx.  Procedure                               Abnormality         Status                     ---------                               -----------         ------                     SARS-CoV-2 (COVID-19), P...[[id number]]  Normal              Final result                 Please view results for these tests on the individual orders.    SARS-CoV-2 (COVID-19), PCR Nasopharynx [[id number]]  (Normal) Collected: 04/26/22 2135    Specimen: Nasopharyngeal Swab from Nasopharynx Updated: 04/26/22 2206     SARS-CoV-2 (COVID-19) Negative    RSV and Influenza Nucleic Acids, PCR Nasopharynx [[id number]]  (Normal) Collected: 04/26/22 2135    Specimen: Nasopharyngeal Swab from Nasopharynx Updated: 04/26/22 2326     Influenza A Negative     Influenza B Negative     Respiratory Syncytial Virus Negative        SARS-CoV-2 (COVID-19) (no units)   Date/Time Value   04/26/2022 2135 Negative         Imaging  X-RAY CHEST 2 VIEWS    Result Date: 4/27/2022  Narrative: CLINICAL HISTORY:  Cough, new onset     Impression: IMPRESSION:  No evidence of active cardiopulmonary disease. Large lung volumes concerning for COPD. COMPARISON: None  available. COMMENT:  PA and lateral views of the chest are completed. Large lung volumes noted with some flattening hemidiaphragms, prominent retrosternal airspace, concerning for COPD. There is no consolidation or pleural fluid. No pleural air. Right nipple shadow noted. Normal pulmonary vascularity  and cardiac size noted. Mild atheromatous disease in the thoracic aorta. Unremarkable hilar and mediastinal contours. The imaged upper abdomen is unremarkable. There are diffuse spondylitic changes through the mid lower dorsal spine. Reverse right total shoulder arthroplasty noted, with long intramedullary benji.     CT CHEST WITHOUT IV CONTRAST    Result Date: 4/27/2022  Narrative: CLINICAL HISTORY: Lung nodule, > 8mm. COMMENT: Comparison: Chest radiographs 4/26/2022 Technique: CT of the Chest was performed without intravenous contrast. No intravenous contrast was administered as per ordering physician's request. This limits sensitivity for certain processes. Sagittal and coronal reconstructions were obtained. CT DOSE:  One or more dose reduction techniques (e.g. automated exposure control, adjustment of the mA and/or kV according to patient size, use of iterative reconstruction technique) was utilized for this examination. Findings: LUNGS, LARGE AIRWAYS, PLEURA: There is diffuse peribronchial thickening.  No focal consolidation, pleural effusion, or pneumothorax.  Mild linear atelectasis or scarring in the right middle lobe and lingula.  Mild dependent hypoventilatory changes.  There are two 2 mm nodules in the left upper lobe (images 84 and 88) and a 4 mm nodule in the right upper lobe (image 112). VESSELS: Atherosclerotic changes in the thoracic aorta and coronary arteries. HEART: Cardiomegaly. Small pericardial effusion. MEDIASTINUM AND KEIRA: No lymphadenopathy within the limitations of the lack of intravenous contrast. CHEST WALL AND LOWER NECK: Mild anasarca. UPPER ABDOMEN: There is high density in the renal medullas, which may be related to medullary nephrocalcinosis.  There is a right upper pole renal cyst. BONES: Partially imaged right shoulder arthroplasty.  Old unhealed fracture of the mid body of sternum. Degenerative changes of the imaged spine.     Impression: IMPRESSION: 1.  Few small pulmonary nodules  measuring up to 4 mm. 2.  Diffuse peribronchial thickening. 3.  Cardiomegaly.  Small pericardial effusion. 4.  Findings suggestive of medullary nephrocalcinosis. -------------------------------------------------------------------------------- ---------------- Fleischner Society 2017 Guidelines for Management of Incidentally Detected Pulmonary Nodules in Adults. (Solid Nodules) Nodule size < 6 mm (multiple) Low-Risk Patient - No routine follow-up High-Risk Patient - Optional CT at 12 months -------------------------------------------------------------------------------- ----------------        ASSESSMENT and PLAN  Mr. Raman is a 89yo male with PMH COPD, HTM, HLD, T1DM, CKD, LBBB who presented 4/26 with productive cough and generalized weakness x1.5 weeks with worsening respiratory symptoms. He was found to be hyponatremic.     Hyponatremia  - IVF hydration   - Na+ trending up, 132 today  - BUN remains elevated, 47  - trend BMP    COPD  - continue duo-neb + budesonide   - continue tessalon, mucinex  - IV Azithromycin   - maintain O2 >88%  - f/u pulm     T1DM  - continue accu check  - endo f/u     CKD  - BUN/Cr remain elevated, today 47:1.4  - continue IVF    Pulmonary nodule  - CXR and CT chest revealed small pulmonary nodules <4mm.   - low-risk, no intervention indicated  - pulm f/u    GERD  - controlled, continue Protonix    Delirium  - U/A negative for UTI   - continue to monitor for signs   - hold steroids        VTE Prophylaxis Plan: Heparin  Code Status: DNR (A.N.D.)  Estimated Discharge Date: 5/1/2022      CARMEN Mai-S2

## 2022-04-28 NOTE — ASSESSMENT & PLAN NOTE
- Acute bronchitis + asthma exacerbation   - Pulmonology consulted, recommendations appreciated   - Steroids were held due to uncontrolled blood glucose but then resumed due to respiratory status  - Rapid steroid taper. Prednisone 40 x 1 day, 30 x 1, 20 x 1, 10 x 1 -> has 1 more day of 10mg prednisone tomorrow then done with steroid  - Discussed with pulmonology   - Endocrinology consulted, recommendations appreciated.

## 2022-04-28 NOTE — PROGRESS NOTES
Hospital Medicine Service -  Daily Progress Note       SUBJECTIVE   Interval History:     Hypoglycemic overnight requiring D5. Mr. Raman was seen and examined at bedside. He feels well and has no complaints.      OBJECTIVE      Vital signs in last 24 hours:  Temp:  [36.3 °C (97.3 °F)-36.8 °C (98.3 °F)] 36.3 °C (97.3 °F)  Heart Rate:  [70-95] 88  Resp:  [16-20] 16  BP: (118-155)/() 150/64    Intake/Output Summary (Last 24 hours) at 4/28/2022 1434  Last data filed at 4/28/2022 0600  Gross per 24 hour   Intake 2627.67 ml   Output 400 ml   Net 2227.67 ml       PHYSICAL EXAMINATION      Physical Exam    General: No acute distress. Non toxic appearing.   HEENT: NC/AT  MMM  Respiratory: Scattered expiratory wheezes. No rhonchi or rales. Non labored breathing.   Cardiovascular: RRR. Normal S1 and S2.    Abdomen: Soft, non distended, non tender. Bowel sounds present.   Psychiatric: Calm and cooperative.         LINES, CATHETERS, DRAINS, AIRWAYS, AND WOUNDS   Lines, Drains, and Airways:  Wounds (agree with documentation and present on admission):  Peripheral IV (Adult) 04/27/22 Left;Posterior Forearm (Active)   Number of days: 1       Peripheral IV (Adult) 04/28/22 Anterior;Right Forearm (Active)   Number of days: 0           LABS / IMAGING / TELE      Labs    CBC Results       04/28/22 04/26/22     0445 2130    WBC 11.52 7.63    RBC 3.87 4.34    HGB 11.7 13.4    HCT 36.6 40.7    MCV 94.6 93.8    MCH 30.2 30.9    MCHC 32.0 32.9     249        CMP Results       04/28/22 04/27/22 04/27/22     0445 2201 1746     129 127    K 4.9 4.0 4.4    Cl 100 100 96    CO2 19 19 20    Glucose 262 213 614    BUN 47 46 47    Creatinine 1.4 1.6 1.8    Calcium 8.4 8.3 8.2    Anion Gap 13 10 11    EGFR 47.3 40.5 35.4         Comment for K at 1746 on 04/27/22: Results obtained on plasma. Plasma Potassium values may be up to 0.4 mEQ/L less than serum values. The differences may be greater for patients with high platelet or  white cell counts.            SARS-CoV-2 (COVID-19) (no units)   Date/Time Value   04/26/2022 2135 Negative            ASSESSMENT AND PLAN      Acute bronchitis  Assessment & Plan  - Acute bronchitis + asthma exacerbation   - Pulmonology consulted, recommendations appreciated   - Steroids were held due to uncontrolled blood glucose   - Will resume methylprednisolone 30 mg q12h   - Resume insulin drip   - Endocrinology consulted, recommendations appreciated.          COPD (chronic obstructive pulmonary disease) (CMS/[facility])  Assessment & Plan  - See acute bronchitis         Diabetes mellitus type I (CMS/[facility])  Assessment & Plan  - Remain in PCU   - Starting insulin drip again as patient needs steroids   - Endocrine following  - Will continue to monitor closely       Delirium  Assessment & Plan  - UA negative for infection     Pulmonary nodule  Assessment & Plan  - CXR with right-sided pulmonary nodule, no prior imaging for comparison  - Labs significant for hyponatremia  - CT chest (4/27/22): Few small pulmonary nodules measuring up to 4 mm.  Diffuse peribronchial thickening.   Cardiomegaly.  Small pericardial effusion. Findings suggestive of medullary nephrocalcinosis.   - Per pulmonology has minimal and insignificant pulmonary nodules, particularly at his advanced age.        HLD (hyperlipidemia)  Assessment & Plan  - Not on home regimen    Stage 3 chronic kidney disease (CMS/[facility])  Assessment & Plan  - Renal function stable at baseline    * Hyponatremia  Assessment & Plan  - Mostly due to hyperglycemia   - Will continue to monitor          VTE Assessment: Padua    VTE Prophylaxis:  Current anticoagulants:  heparin (porcine) 5,000 unit/mL injection 5,000 Units, subcutaneous, q8h INT      Code Status: DNR (A.N.D.)      Estimated Discharge Date: 5/1/2022         Agustin Aj MD  4/28/2022

## 2022-04-28 NOTE — PLAN OF CARE
Plan of Care Review  Plan of Care Reviewed With: patient, daughter  Progress: no change  Outcome Summary: Pts Blood Glucose Improved 168. Pt remains confused. Steriods to be restarted tonight.

## 2022-04-28 NOTE — ASSESSMENT & PLAN NOTE
- Off insulin drip now on subcutaneous insulin   - Endocrine following  - Will continue to monitor closely     Discussed with endo regarding d/c insulin dose. Recommend to continue current regiment at home.

## 2022-04-28 NOTE — PATIENT CARE CONFERENCE
Care Progression Rounds Note  Date: 4/28/2022  Time: 9:17 AM     Patient Name: Darron Raman     Medical Record Number: [id number]   YOB: 1928  Sex: Male      Room/Bed: 3218    Admitting Diagnosis: Cough [R05.9]  Hyperkalemia [E87.5]  Hyponatremia [E87.1]  Hyperglycemia [R73.9]  Hypoxia [R09.02]  Elevated troponin [R77.8]   Admit Date/Time: 4/26/2022  9:53 PM    Primary Diagnosis: Hyponatremia  Principal Problem: Hyponatremia    GMLOS: pending  Anticipated Discharge Date: 5/1/2022    AM-PAC:  Mobility Score:      Discharge Planning:  Anticipated Discharge Disposition: skilled nursing facility, home with home health    Barriers to Discharge:  Medical issues not resolved    Comments:       Participants:  advanced practice provider, , nursing

## 2022-04-28 NOTE — NURSING NOTE
"Pt admitted at 1915 from ED with insulin gtt infusing at 13.86 units/hr. Blood glucose checked at 490. Insulin gtt increased to 15.05. Dr. Marshall notified, 500 mL NS bolus ordered over 1 hr. BG checked at 2007 at 457, rate increased to 15.48 units/hr. Dr. Marshall notified of insulin gtt ordered as drug calculated protocol. Order range from 0-15 units/hr. No further orders at this time. MD and pharmacist aware insulin gtt needed to be overridden to meet dose needed for blood glucose of 457. Waiting on 500 mL bolus to be completed. Blood glucose to be checked around 2100 per protocol.     2300 blood glucose trended downtown to 86 from blood glucose of 204 around 2200. Pt states \"I can feel myself getting weaker.\" Pt AAOx4. RN spoke with Dr. Marshall regarding pt. NS d/c'ed and verbal order provided for D5 NS at 80 ml/hr to begin infusing, and for BMP, CBC and Mag for AM.  Endocrine's note reported to Dr. Marshall. Per pt, he does not want to start sliding scale or long acting insulin tonight. Pt states-\"That''s too many changes. I can start it tomorrow.\"    0000 BG 56, pt AAOx4. Insulin gtt stopped. 20 mL dextrose given per order.  Pt also having frequent trigeminy/bigeminy.  messaged to make aware. Otherwise vital signs stable.   "

## 2022-04-28 NOTE — PROGRESS NOTES
Pulmonary Progress Note         Subjective    Interval History: No major respiratory events.  Placed in PCU presumably due to very high blood sugars up past 600.  This morning he was in the 200 range.  He is on an insulin regimen not on an insulin drip but was on a drip overnight. Remains off steroids.    Objective    Vitals:    04/28/22 0200 04/28/22 0400 04/28/22 0415 04/28/22 0600   BP: 134/62  (!) 144/64 (!) 154/79   BP Location:   Right upper arm    Patient Position:   Lying    Pulse: 73 70 75 83   Resp: 18  18 18   Temp:   36.8 °C (98.3 °F)    TempSrc:   Oral    SpO2: 100%  100% 97%   Weight:       Height:              Intake/Output Summary (Last 24 hours) at 4/28/2022 0902  Last data filed at 4/28/2022 0600  Gross per 24 hour   Intake 2627.67 ml   Output 400 ml   Net 2227.67 ml       Physical Exam:    HEENT:  Normocephalic, atraumatic  Eyes:  Sclera anicteric, conjunctiva pink, pupils equal and reactive to light  Neck: no adenopathy, no JVD  Lungs: Rhonchi with expiratory wheezing, very wet cough  Cor:  RRR normal S1 and S2, no murmurs, gallops or rubs  Abd: Soft Nontender, nondistended, normal bowel sounds  Extremities: no Cyanosis, clubbing, edema  Skin: no rash, no skin breakdown  Neuro: alert and oriented, no focal deficits, except hearing loss  Psych: normal affect, good eye contact  Joints: no deformities, no warmth or erythema    Labs:  Lab Results   Component Value Date    WBC 11.52 (H) 04/28/2022    HGB 11.7 (L) 04/28/2022    HCT 36.6 (L) 04/28/2022    MCV 94.6 04/28/2022     04/28/2022     Lab Results   Component Value Date    GLUCOSE 262 (H) 04/28/2022    CALCIUM 8.4 (L) 04/28/2022     (L) 04/28/2022    K 4.9 04/28/2022    CO2 19 (L) 04/28/2022     04/28/2022    BUN 47 (H) 04/28/2022    CREATININE 1.4 (H) 04/28/2022     No results found for: CKTOTAL, CKMB, CKMBINDEX, TROPONINI    Imaging:  X-RAY CHEST 2 VIEWS    Result Date: 4/27/2022  IMPRESSION:  No evidence of active  cardiopulmonary disease. Large lung volumes concerning for COPD. COMPARISON: None  available. COMMENT:  PA and lateral views of the chest are completed. Large lung volumes noted with some flattening hemidiaphragms, prominent retrosternal airspace, concerning for COPD. There is no consolidation or pleural fluid. No pleural air. Right nipple shadow noted. Normal pulmonary vascularity and cardiac size noted. Mild atheromatous disease in the thoracic aorta. Unremarkable hilar and mediastinal contours. The imaged upper abdomen is unremarkable. There are diffuse spondylitic changes through the mid lower dorsal spine. Reverse right total shoulder arthroplasty noted, with long intramedullary benji.     CT CHEST WITHOUT IV CONTRAST    Result Date: 4/27/2022  IMPRESSION: 1.  Few small pulmonary nodules measuring up to 4 mm. 2.  Diffuse peribronchial thickening. 3.  Cardiomegaly.  Small pericardial effusion. 4.  Findings suggestive of medullary nephrocalcinosis. -------------------------------------------------------------------------------- ---------------- Fleischner Society 2017 Guidelines for Management of Incidentally Detected Pulmonary Nodules in Adults. (Solid Nodules) Nodule size < 6 mm (multiple) Low-Risk Patient - No routine follow-up High-Risk Patient - Optional CT at 12 months -------------------------------------------------------------------------------- ----------------      Assessment & Plan    Assessment   89 y.o. male being consulted for acute bronchitis with a asthma exacerbation.  Likely viral trigger, sick contacts.  COVID-negative.  At this point may have developed a secondary bacterial bronchitis.  Has minimal and insignificant pulmonary nodules, particularly at his advanced age.     Plan      -Foregoing systemic steroids per HMS due to severe hyperglycemia.  I would strongly consider solumedrol 30 q12 and placing him back on insulin gtt. He just took his blood sugar for me and it is above 400 again.  -On  bronchodilators DuoNebs every 6 hours and budesonide twice daily  -Continue azithromycin for 5 days total is reasonable today but will be day 2 out of 5  -Sputum culture if he is able to produce.  -DVT prophylaxis with SQ heparin    More than 25 minutes were spent obtaining a detailed interval history, detailed exam, evaluating this high complexity case with significant medical decision making and coordination of care with primary team and consultants.          Klaudia Duarte MD

## 2022-04-28 NOTE — PLAN OF CARE
Problem: Adult Inpatient Plan of Care  Goal: Readiness for Transition of Care  Intervention: Mutually Develop Transition Plan  Flowsheets (Taken 4/28/2022 1153)  Anticipated Discharge Disposition: home with home health  Assistive Device/Animal Currently Used at Home:   walker, front-wheeled   wheelchair  Anticipated Changes Related to Illness: inability to care for self  Outpatient/Agency/Support Group Needs: homecare agency  Current Discharge Risk: lives alone  Readmission Within the Last 30 Days: no previous admission in last 30 days  Patient/Family Anticipated Services at Transition: home health care  Patient/Family Anticipates Transition to: home with help/services  Concerns to be Addressed:   care coordination/care conferences   discharge planning  Current Outpatient/Agency/Support Group: (24/7 aide)   homecare agency   other (see comments)  Type of Home Care Services:   home OT   home PT   nursing    Spoke with patient at bedside, and daughter, Bertha Deutsch, his daughter and POA on phone.  Went over RNCC role and discharge planning.  Confirmed PCP and pharmacy as [facility] in [place].  Daughter states pt does have a living will.  Pt arrived from  where he was staying in an apartment in [place], post discharge from rehab there, per daughter trying the [place] out to see if it was suitable to him.  Scheduled to be discharged to home tomorrow, at patient's request.  He lives at home alone, but will have a 24/7 aid in the home for the first month per daughter.  Pt uses a walker and WC at home and is having a WC accessible bathroom built on 2nd level of home.  Will still have to go up flight of stairs, but is able to stay on first floor if needed.  Pt is current with [facility] for PT/OT/RN and daughter would like to continue that at discharge.  Pt admitted for COPD/hyponatremia, transferred to PCU r/t high blood sugars and on a insulin drip.  Insulin drip has been changed to SQ insulin, steroids d/luis.  Continuing  with duonebs, iv abx.   Will continue to follow for discharge needs.

## 2022-04-28 NOTE — ASSESSMENT & PLAN NOTE
- CXR with right-sided pulmonary nodule, no prior imaging for comparison  - Labs significant for hyponatremia  - CT chest (4/27/22): Few small pulmonary nodules measuring up to 4 mm.  Diffuse peribronchial thickening.   Cardiomegaly.  Small pericardial effusion. Findings suggestive of medullary nephrocalcinosis.   - Per pulmonology has minimal and insignificant pulmonary nodules, particularly at his advanced age.

## 2022-04-29 PROBLEM — R94.31 PROLONGED Q-T INTERVAL ON ECG: Status: ACTIVE | Noted: 2022-04-29

## 2022-04-29 LAB
ANION GAP SERPL CALC-SCNC: 11 MEQ/L (ref 3–15)
ANION GAP SERPL CALC-SCNC: 9 MEQ/L (ref 3–15)
BUN SERPL-MCNC: 48 MG/DL (ref 8–20)
BUN SERPL-MCNC: 49 MG/DL (ref 8–20)
CALCIUM SERPL-MCNC: 8.5 MG/DL (ref 8.9–10.3)
CALCIUM SERPL-MCNC: 8.7 MG/DL (ref 8.9–10.3)
CHLORIDE SERPL-SCNC: 100 MEQ/L (ref 98–109)
CHLORIDE SERPL-SCNC: 98 MEQ/L (ref 98–109)
CO2 SERPL-SCNC: 22 MEQ/L (ref 22–32)
CO2 SERPL-SCNC: 22 MEQ/L (ref 22–32)
CREAT SERPL-MCNC: 1.5 MG/DL (ref 0.8–1.3)
CREAT SERPL-MCNC: 1.5 MG/DL (ref 0.8–1.3)
ERYTHROCYTE [DISTWIDTH] IN BLOOD BY AUTOMATED COUNT: 12.3 % (ref 11.6–14.4)
GFR SERPL CREATININE-BSD FRML MDRD: 43.7 ML/MIN/1.73M*2
GFR SERPL CREATININE-BSD FRML MDRD: 43.7 ML/MIN/1.73M*2
GLUCOSE BLD-MCNC: 114 MG/DL (ref 70–99)
GLUCOSE BLD-MCNC: 165 MG/DL (ref 70–99)
GLUCOSE BLD-MCNC: 213 MG/DL (ref 70–99)
GLUCOSE BLD-MCNC: 306 MG/DL (ref 70–99)
GLUCOSE BLD-MCNC: 354 MG/DL (ref 70–99)
GLUCOSE BLD-MCNC: 405 MG/DL (ref 70–99)
GLUCOSE BLD-MCNC: 421 MG/DL (ref 70–99)
GLUCOSE BLD-MCNC: 429 MG/DL (ref 70–99)
GLUCOSE BLD-MCNC: 471 MG/DL (ref 70–99)
GLUCOSE BLD-MCNC: 87 MG/DL (ref 70–99)
GLUCOSE SERPL-MCNC: 383 MG/DL (ref 70–99)
GLUCOSE SERPL-MCNC: 513 MG/DL (ref 70–99)
HCT VFR BLDCO AUTO: 40 % (ref 40.1–51)
HGB BLD-MCNC: 12.9 G/DL (ref 13.7–17.5)
MAGNESIUM SERPL-MCNC: 2.3 MG/DL (ref 1.8–2.5)
MCH RBC QN AUTO: 30.9 PG (ref 28–33.2)
MCHC RBC AUTO-ENTMCNC: 32.3 G/DL (ref 32.2–36.5)
MCV RBC AUTO: 95.7 FL (ref 83–98)
PDW BLD AUTO: 10.2 FL (ref 9.4–12.4)
PLATELET # BLD AUTO: 187 K/UL (ref 150–350)
POCT TEST: ABNORMAL
POCT TEST: NORMAL
POTASSIUM SERPL-SCNC: 4.5 MEQ/L (ref 3.6–5.1)
POTASSIUM SERPL-SCNC: 5.3 MEQ/L (ref 3.6–5.1)
RBC # BLD AUTO: 4.18 M/UL (ref 4.5–5.8)
SODIUM SERPL-SCNC: 129 MEQ/L (ref 136–144)
SODIUM SERPL-SCNC: 133 MEQ/L (ref 136–144)
WBC # BLD AUTO: 6.61 K/UL (ref 3.8–10.5)

## 2022-04-29 PROCEDURE — 36415 COLL VENOUS BLD VENIPUNCTURE: CPT | Performed by: FAMILY MEDICINE

## 2022-04-29 PROCEDURE — 25800000 HC PHARMACY IV SOLUTIONS: Performed by: PHYSICIAN ASSISTANT

## 2022-04-29 PROCEDURE — 99233 SBSQ HOSP IP/OBS HIGH 50: CPT | Performed by: FAMILY MEDICINE

## 2022-04-29 PROCEDURE — 63700000 HC SELF-ADMINISTRABLE DRUG: Performed by: PHYSICIAN ASSISTANT

## 2022-04-29 PROCEDURE — 25800000 HC PHARMACY IV SOLUTIONS: Performed by: FAMILY MEDICINE

## 2022-04-29 PROCEDURE — 63700000 HC SELF-ADMINISTRABLE DRUG: Performed by: INTERNAL MEDICINE

## 2022-04-29 PROCEDURE — 63600000 HC DRUGS/DETAIL CODE: Performed by: PHYSICIAN ASSISTANT

## 2022-04-29 PROCEDURE — 63700000 HC SELF-ADMINISTRABLE DRUG: Performed by: FAMILY MEDICINE

## 2022-04-29 PROCEDURE — 63600000 HC DRUGS/DETAIL CODE: Performed by: INTERNAL MEDICINE

## 2022-04-29 PROCEDURE — 63700000 HC SELF-ADMINISTRABLE DRUG: Performed by: HOSPITALIST

## 2022-04-29 PROCEDURE — 20600000 HC ROOM AND CARE INTERMEDIATE/TELEMETRY

## 2022-04-29 PROCEDURE — 63600000 HC DRUGS/DETAIL CODE: Performed by: FAMILY MEDICINE

## 2022-04-29 PROCEDURE — 94640 AIRWAY INHALATION TREATMENT: CPT

## 2022-04-29 PROCEDURE — 82310 ASSAY OF CALCIUM: CPT | Performed by: INTERNAL MEDICINE

## 2022-04-29 PROCEDURE — 25800000 HC PHARMACY IV SOLUTIONS: Performed by: INTERNAL MEDICINE

## 2022-04-29 PROCEDURE — 83735 ASSAY OF MAGNESIUM: CPT | Performed by: FAMILY MEDICINE

## 2022-04-29 PROCEDURE — 25000000 HC PHARMACY GENERAL: Performed by: INTERNAL MEDICINE

## 2022-04-29 PROCEDURE — 93005 ELECTROCARDIOGRAM TRACING: CPT | Performed by: FAMILY MEDICINE

## 2022-04-29 PROCEDURE — 25000000 HC PHARMACY GENERAL: Performed by: PHYSICIAN ASSISTANT

## 2022-04-29 PROCEDURE — 85027 COMPLETE CBC AUTOMATED: CPT | Performed by: FAMILY MEDICINE

## 2022-04-29 PROCEDURE — 80048 BASIC METABOLIC PNL TOTAL CA: CPT | Performed by: FAMILY MEDICINE

## 2022-04-29 RX ORDER — PREDNISONE 10 MG/1
10 TABLET ORAL DAILY
Status: DISCONTINUED | OUTPATIENT
Start: 2022-05-03 | End: 2022-05-02 | Stop reason: HOSPADM

## 2022-04-29 RX ORDER — PREDNISONE 20 MG/1
40 TABLET ORAL DAILY
Status: COMPLETED | OUTPATIENT
Start: 2022-04-30 | End: 2022-04-30

## 2022-04-29 RX ORDER — HYDRALAZINE HYDROCHLORIDE 20 MG/ML
10 INJECTION INTRAMUSCULAR; INTRAVENOUS EVERY 8 HOURS PRN
Status: DISCONTINUED | OUTPATIENT
Start: 2022-04-29 | End: 2022-05-02 | Stop reason: HOSPADM

## 2022-04-29 RX ORDER — INSULIN ASPART 100 [IU]/ML
0-12 INJECTION, SOLUTION INTRAVENOUS; SUBCUTANEOUS
Status: DISCONTINUED | OUTPATIENT
Start: 2022-04-29 | End: 2022-04-29

## 2022-04-29 RX ORDER — PREDNISONE 20 MG/1
20 TABLET ORAL DAILY
Status: COMPLETED | OUTPATIENT
Start: 2022-05-02 | End: 2022-05-02

## 2022-04-29 RX ORDER — INSULIN ASPART 100 [IU]/ML
6 INJECTION, SOLUTION INTRAVENOUS; SUBCUTANEOUS
Status: DISCONTINUED | OUTPATIENT
Start: 2022-04-30 | End: 2022-04-30

## 2022-04-29 RX ORDER — AMLODIPINE BESYLATE 5 MG/1
5 TABLET ORAL DAILY
Status: DISCONTINUED | OUTPATIENT
Start: 2022-04-29 | End: 2022-05-02 | Stop reason: HOSPADM

## 2022-04-29 RX ORDER — SODIUM CHLORIDE 9 MG/ML
INJECTION, SOLUTION INTRAVENOUS CONTINUOUS
Status: DISCONTINUED | OUTPATIENT
Start: 2022-04-29 | End: 2022-04-29

## 2022-04-29 RX ORDER — PREDNISONE 20 MG/1
40 TABLET ORAL DAILY
Status: DISCONTINUED | OUTPATIENT
Start: 2022-04-30 | End: 2022-04-29

## 2022-04-29 RX ORDER — DEXTROSE 50 % IN WATER (D50W) INTRAVENOUS SYRINGE
10-30 AS NEEDED
Status: DISCONTINUED | OUTPATIENT
Start: 2022-04-29 | End: 2022-04-30

## 2022-04-29 RX ORDER — DEXTROSE MONOHYDRATE 50 MG/ML
INJECTION, SOLUTION INTRAVENOUS CONTINUOUS
Status: DISCONTINUED | OUTPATIENT
Start: 2022-04-30 | End: 2022-04-30

## 2022-04-29 RX ADMIN — IPRATROPIUM BROMIDE AND ALBUTEROL SULFATE 3 ML: 2.5; .5 SOLUTION RESPIRATORY (INHALATION) at 14:59

## 2022-04-29 RX ADMIN — BENZONATATE 100 MG: 100 CAPSULE ORAL at 17:13

## 2022-04-29 RX ADMIN — SODIUM CHLORIDE 8.22 UNITS/HR: 9 INJECTION, SOLUTION INTRAVENOUS at 16:17

## 2022-04-29 RX ADMIN — PANTOPRAZOLE SODIUM 40 MG: 40 TABLET, DELAYED RELEASE ORAL at 08:32

## 2022-04-29 RX ADMIN — DEXTROSE MONOHYDRATE: 50 INJECTION, SOLUTION INTRAVENOUS at 23:13

## 2022-04-29 RX ADMIN — BUDESONIDE 0.5 MG: 0.5 INHALANT ORAL at 19:40

## 2022-04-29 RX ADMIN — HYDRALAZINE HYDROCHLORIDE 10 MG: 20 INJECTION INTRAMUSCULAR; INTRAVENOUS at 12:23

## 2022-04-29 RX ADMIN — BENZONATATE 100 MG: 100 CAPSULE ORAL at 12:24

## 2022-04-29 RX ADMIN — HEPARIN SODIUM 5000 UNITS: 5000 INJECTION, SOLUTION INTRAVENOUS; SUBCUTANEOUS at 12:24

## 2022-04-29 RX ADMIN — INSULIN GLARGINE 6 UNITS: 100 INJECTION, SOLUTION SUBCUTANEOUS at 08:35

## 2022-04-29 RX ADMIN — AZITHROMYCIN MONOHYDRATE 500 MG: 500 INJECTION, POWDER, LYOPHILIZED, FOR SOLUTION INTRAVENOUS at 01:31

## 2022-04-29 RX ADMIN — HEPARIN SODIUM 5000 UNITS: 5000 INJECTION, SOLUTION INTRAVENOUS; SUBCUTANEOUS at 04:04

## 2022-04-29 RX ADMIN — IPRATROPIUM BROMIDE AND ALBUTEROL SULFATE 3 ML: 2.5; .5 SOLUTION RESPIRATORY (INHALATION) at 08:02

## 2022-04-29 RX ADMIN — INSULIN ASPART 5 UNITS: 100 INJECTION, SOLUTION INTRAVENOUS; SUBCUTANEOUS at 08:34

## 2022-04-29 RX ADMIN — GUAIFENESIN 600 MG: 600 TABLET ORAL at 20:49

## 2022-04-29 RX ADMIN — HEPARIN SODIUM 5000 UNITS: 5000 INJECTION, SOLUTION INTRAVENOUS; SUBCUTANEOUS at 20:49

## 2022-04-29 RX ADMIN — METHYLPREDNISOLONE SODIUM SUCCINATE 30 MG: 40 INJECTION, POWDER, FOR SOLUTION INTRAMUSCULAR; INTRAVENOUS at 08:31

## 2022-04-29 RX ADMIN — BENZONATATE 100 MG: 100 CAPSULE ORAL at 06:48

## 2022-04-29 RX ADMIN — IPRATROPIUM BROMIDE AND ALBUTEROL SULFATE 3 ML: 2.5; .5 SOLUTION RESPIRATORY (INHALATION) at 19:40

## 2022-04-29 RX ADMIN — GUAIFENESIN 600 MG: 600 TABLET ORAL at 08:32

## 2022-04-29 RX ADMIN — INSULIN ASPART 6 UNITS: 100 INJECTION, SOLUTION INTRAVENOUS; SUBCUTANEOUS at 12:26

## 2022-04-29 RX ADMIN — BUDESONIDE 0.5 MG: 0.5 INHALANT ORAL at 08:03

## 2022-04-29 RX ADMIN — AMLODIPINE BESYLATE 5 MG: 5 TABLET ORAL at 10:14

## 2022-04-29 RX ADMIN — INSULIN ASPART 12 UNITS: 100 INJECTION, SOLUTION INTRAVENOUS; SUBCUTANEOUS at 12:26

## 2022-04-29 RX ADMIN — SODIUM CHLORIDE: 9 INJECTION, SOLUTION INTRAVENOUS at 12:29

## 2022-04-29 RX ADMIN — INSULIN ASPART 6 UNITS: 100 INJECTION, SOLUTION INTRAVENOUS; SUBCUTANEOUS at 08:33

## 2022-04-29 NOTE — PROGRESS NOTES
Hospital Medicine Service -  Daily Progress Note       SUBJECTIVE   Interval History:     Overnight worsening delirium.      OBJECTIVE      Vital signs in last 24 hours:  Temp:  [36.3 °C (97.4 °F)-36.8 °C (98.3 °F)] 36.5 °C (97.7 °F)  Heart Rate:  [] 80  Resp:  [16-20] 20  BP: (110-210)/() 148/72    Intake/Output Summary (Last 24 hours) at 4/29/2022 1457  Last data filed at 4/29/2022 1400  Gross per 24 hour   Intake 1501.33 ml   Output --   Net 1501.33 ml       PHYSICAL EXAMINATION      Physical Exam       General: No acute distress. Non toxic appearing.   HEENT: NC/AT  MMM  Respiratory: Scattered expiratory wheezes. No rhonchi or rales. Non labored breathing.   Cardiovascular: RRR. Normal S1 and S2.    Abdomen: Soft, non distended, non tender. Bowel sounds present.   Psychiatric: Calm and cooperative.    LINES, CATHETERS, DRAINS, AIRWAYS, AND WOUNDS   Lines, Drains, and Airways:  Wounds (agree with documentation and present on admission):  Peripheral IV (Adult) 04/27/22 Left;Posterior Forearm (Active)   Number of days: 2       Peripheral IV (Adult) 04/28/22 Anterior;Right Forearm (Active)   Number of days: 1           LABS / IMAGING / TELE      Labs    CBC Results       04/29/22 04/28/22 04/26/22     1155 0445 2130    WBC 6.61 11.52 7.63    RBC 4.18 3.87 4.34    HGB 12.9 11.7 13.4    HCT 40.0 36.6 40.7    MCV 95.7 94.6 93.8    MCH 30.9 30.2 30.9    MCHC 32.3 32.0 32.9     258 249        CMP Results       04/29/22 04/28/22 04/27/22     1319 0445 2201     132 129    K 5.3 4.9 4.0    Cl 98 100 100    CO2 22 19 19    Glucose 513 262 213    BUN 49 47 46    Creatinine 1.5 1.4 1.6    Calcium 8.5 8.4 8.3    Anion Gap 9 13 10    EGFR 43.7 47.3 40.5            SARS-CoV-2 (COVID-19) (no units)   Date/Time Value   04/26/2022 2135 Negative            ASSESSMENT AND PLAN      Acute bronchitis  Assessment & Plan  - Acute bronchitis + asthma exacerbation   - Pulmonology consulted, recommendations  appreciated   - Steroids were held due to uncontrolled blood glucose but then resumed due to respiratory status  - Rapid steroid taper. Prednisone 40 x 1 day, 30 x 1, 20 x 1, 10 x 1   - Discussed with pulmonology   - Resume insulin drip   - Endocrinology consulted, recommendations appreciated.          Delirium  Assessment & Plan  - UA negative for infection   - Exacerbated by steroids.   - Plan on quick taper   - Avoid QT prolonging medications     COPD (chronic obstructive pulmonary disease) (CMS/[facility])  Assessment & Plan  - See acute bronchitis         Diabetes mellitus type I (CMS/[facility])  Assessment & Plan  - Remain in PCU   - Starting insulin drip again as patient needs steroids   - Endocrine following  - Will continue to monitor closely       Pulmonary nodule  Assessment & Plan  - CXR with right-sided pulmonary nodule, no prior imaging for comparison  - Labs significant for hyponatremia  - CT chest (4/27/22): Few small pulmonary nodules measuring up to 4 mm.  Diffuse peribronchial thickening.   Cardiomegaly.  Small pericardial effusion. Findings suggestive of medullary nephrocalcinosis.   - Per pulmonology has minimal and insignificant pulmonary nodules, particularly at his advanced age.        HLD (hyperlipidemia)  Assessment & Plan  - Not on home regimen    Stage 3 chronic kidney disease (CMS/[facility])  Assessment & Plan  - Renal function stable at baseline    * Hyponatremia  Assessment & Plan  - Mostly due to hyperglycemia   - Will continue to monitor     Prolonged Q-T interval on ECG  Assessment & Plan  - EKG (4/26/22): QTc 499   - Recheck EKG          VTE Assessment: Padua    VTE Prophylaxis:  Current anticoagulants:  heparin (porcine) 5,000 unit/mL injection 5,000 Units, subcutaneous, q8h INT      Code Status: DNR (A.N.D.)      Estimated Discharge Date: 5/2/2022           Agustin Aj MD  4/29/2022

## 2022-04-29 NOTE — PROGRESS NOTES
Endocrinology Progress Note    Darron Raman   [id number]   12/1/1928   Admission date: 4/26/2022    Interval history: patient eating well. Given 30 mg methylprednisolone this AM and glucose elevated. Patient confused.     Blood Glucose Labs   Latest Reference Range & Units Most Recent 04/29/22 07:21 04/29/22 12:08 04/29/22 16:11 04/29/22 17:02   POCT Bedside Glucose 70 - 99 mg/dL 405 (H) [1]  04/29/22 17:02 354 (H) [2] 421 (H) [3] 471 (HH) [4] 405 (H) [5]   (H): Data is abnormally high  (HH): Data is critically high  [1] : SASHA MARTINEZ (PCT)  [2] : MURRAY MARQUEZ  [3] : MURRAY MARQUEZ  [4] : JANEY MIGUEL  [5] : SASHA MARTINEZ (PCT)  Lab Results   Component Value Date    GLUCOSE 513 (HH) 04/29/2022    CALCIUM 8.5 (L) 04/29/2022     (L) 04/29/2022    K 5.3 (H) 04/29/2022    CO2 22 04/29/2022    CL 98 04/29/2022    BUN 49 (H) 04/29/2022    CREATININE 1.5 (H) 04/29/2022   No results found for: HGBA1C  Current Meds  •  acetaminophen, 650 mg, oral, q4h PRN  •  amLODIPine, 5 mg, oral, Daily  •  atropine, 0.5 mg, intravenous, q5 min PRN  •  azithromycin, 500 mg, intravenous, q24h INT  •  benzonatate, 100 mg, oral, q6h not 12m  •  budesonide, 0.5 mg, nebulization, BID (6a, 6p)  •  glucose, 16-32 g of dextrose, oral, PRN **OR** dextrose, 15-30 g of dextrose, oral, PRN **OR** glucagon, 1 mg, intramuscular, PRN **OR** dextrose in water, 25 mL, intravenous, PRN  •  insulin, 0-15 Units/hr, intravenous, Titrated **AND** dextrose in water, 10-30 mL, intravenous, PRN  •  guaiFENesin, 600 mg, oral, BID  •  heparin (porcine), 5,000 Units, subcutaneous, q8h INT  •  hydrALAZINE, 10 mg, intravenous, q8h PRN  •  ipratropium-albuteroL, 3 mL, nebulization, q6h NETTIE  •  nitroglycerin, 0.4 mg, sublingual, q5 min PRN  •  pantoprazole, 40 mg, oral, Daily  •  [START ON 4/30/2022] predniSONE, 40 mg, oral, Daily **FOLLOWED BY** [START ON 5/1/2022] predniSONE, 30 mg, oral, Daily **FOLLOWED BY** [START  ON 5/2/2022] predniSONE, 20 mg, oral, Daily **FOLLOWED BY** [START ON 5/3/2022] predniSONE, 10 mg, oral, Daily  •  sodium chloride 0.9 %, , intravenous, Continuous        Physical Exam  Temp:  [36.3 °C (97.4 °F)-36.6 °C (97.9 °F)] 36.6 °C (97.8 °F)  Heart Rate:  [] 90  Resp:  [16-20] 20  BP: (110-210)/() 170/79    General appearance: alert and no distress; does not remember his dose of insulin  Head: normocephalic, without obvious abnormality  Lungs: scattered expiratory wheeze  Heart: regular rate and rhythm  Abdomen: soft, non-tender; bowel sounds normal  Extremities: no edema  Neurologic: confused    Assessment/Plan:   Diabetes mellitus: type 1 with steroid induced hyperglycemia. He is admitted with acute bronchitis and was given solumedrol 125 mg on admission.      At home, he is on lantus 4 units bid and novolog 4 units tid meal. His A1c was recently 9.5% per pt's daughter; had been under 7% prior to that. He has had DM for 50 years and has been able to manage well until recently. Sees Dr Garnett as outpt. He has lonny.     He was placed on insulin drip on 2/27 since his BG had spiked to 600s. He also had missed 2 doses of lantus. Insulin drip was stopped when BG dropped to the 50s. He was then started on D5. BG luis a to 200s. Resumed twice daily Lantus yesterday, but Solumedrol 30 mg twice daily started again last night. Today  and luis a to 513 after second dose of Solumedrol despite increase in SQ insulin. Resumed insulin infusion late this afternoon.  Plan is to change to Prednisone 40 mg daily in AM  with taper as outpatient.  Will continue insulin infusion for now. Will resume Lantus twice daily in AM and will need larger dose Novolog with meals while on Prednisone. Home dose is 4 units Lantus twice daily and 4 units Novolog with meals. Suggest 6 units Lantus twice daily and 10 units Novolog with meals plus correction.Will need continued adjustment as outpatient.      Olivia Stevens MD

## 2022-04-29 NOTE — PLAN OF CARE
Problem: Adult Inpatient Plan of Care  Goal: Plan of Care Review  2022 by Marie Martinez RN  Outcome: Progressing  Flowsheets (Taken 2022)  Progress: no change  Plan of Care Reviewed With: patient  Outcome Summary: Pt very impulsive this shift, attempting to bed exit q30 min throughout night, reoriented as needed. Continued with confusion, pt able to state name, , place and year. , no insulin coverage needed. Steriods restarted. On RA, no c/o SOB. Voiding in BR ax1.  2022 by Marie Martinez, RN  Outcome: Progressing  Goal: Patient-Specific Goal (Individualized)  2022 by Marie Martinez RN  Outcome: Progressing  2022 by Marie Martinez RN  Outcome: Progressing  Goal: Absence of Hospital-Acquired Illness or Injury  2022 by Marie Martinez RN  Outcome: Progressing  2022 by Marie Martinez RN  Outcome: Progressing  Goal: Optimal Comfort and Wellbeing  2022 by Marie Martinze RN  Outcome: Progressing  2022 by Marie Martinez RN  Outcome: Progressing  Goal: Readiness for Transition of Care  2022 by Marie Martinez RN  Outcome: Progressing  2022 by Marie Martinez RN  Outcome: Progressing     Problem: Skin Injury Risk Increased  Goal: Skin Health and Integrity  Outcome: Progressing     Problem: Fall Injury Risk  Goal: Absence of Fall and Fall-Related Injury  Outcome: Progressing     Problem: Infection COPD (Chronic Obstructive Pulmonary Disease)  Goal: Absence of Infection Signs and Symptoms  Outcome: Progressing     Problem: Respiratory Compromise COPD (Chronic Obstructive Pulmonary Disease)  Goal: Effective Oxygenation and Ventilation  Outcome: Progressing

## 2022-04-29 NOTE — PROGRESS NOTES
Pulmonary Progress Note         Subjective    Interval History: Placed on IV steroids.  Blood sugar 262 on this morning's chemistries but going up and down.  Agitated overnight and into the am and became a little violent.       Objective    Vitals:    04/29/22 0900 04/29/22 1000 04/29/22 1001 04/29/22 1200   BP:   (!) 172/96 (!) 182/86   BP Location:       Patient Position:       Pulse: 99 93 94 84   Resp: 20 20 20 20   Temp:    36.5 °C (97.7 °F)   TempSrc:    Axillary   SpO2: 96% 97% 96% 97%   Weight:       Height:              Intake/Output Summary (Last 24 hours) at 4/29/2022 1328  Last data filed at 4/29/2022 0500  Gross per 24 hour   Intake 900 ml   Output --   Net 900 ml       Physical Exam:    HEENT:  Normocephalic, atraumatic  Eyes:  Sclera anicteric, conjunctiva pink, pupils equal and reactive to light  Neck: no adenopathy, no JVD  Lungs: Rhonchi with expiratory wheezing, very wet cough  Cor:  RRR normal S1 and S2, no murmurs, gallops or rubs  Abd: Soft Nontender, nondistended, normal bowel sounds  Extremities: no Cyanosis, clubbing, edema  Skin: no rash, no skin breakdown  Neuro: alert and oriented, no focal deficits, except hearing loss  Psych: normal affect, good eye contact  Joints: no deformities, no warmth or erythema    Labs:  Lab Results   Component Value Date    WBC 6.61 04/29/2022    HGB 12.9 (L) 04/29/2022    HCT 40.0 (L) 04/29/2022    MCV 95.7 04/29/2022     04/29/2022     Lab Results   Component Value Date    GLUCOSE 262 (H) 04/28/2022    CALCIUM 8.4 (L) 04/28/2022     (L) 04/28/2022    K 4.9 04/28/2022    CO2 19 (L) 04/28/2022     04/28/2022    BUN 47 (H) 04/28/2022    CREATININE 1.4 (H) 04/28/2022     No results found for: CKTOTAL, CKMB, CKMBINDEX, TROPONINI    Imaging:  X-RAY CHEST 2 VIEWS    Result Date: 4/27/2022  IMPRESSION:  No evidence of active cardiopulmonary disease. Large lung volumes concerning for COPD. COMPARISON: None  available. COMMENT:  PA and lateral  views of the chest are completed. Large lung volumes noted with some flattening hemidiaphragms, prominent retrosternal airspace, concerning for COPD. There is no consolidation or pleural fluid. No pleural air. Right nipple shadow noted. Normal pulmonary vascularity and cardiac size noted. Mild atheromatous disease in the thoracic aorta. Unremarkable hilar and mediastinal contours. The imaged upper abdomen is unremarkable. There are diffuse spondylitic changes through the mid lower dorsal spine. Reverse right total shoulder arthroplasty noted, with long intramedullary benji.     CT CHEST WITHOUT IV CONTRAST    Result Date: 4/27/2022  IMPRESSION: 1.  Few small pulmonary nodules measuring up to 4 mm. 2.  Diffuse peribronchial thickening. 3.  Cardiomegaly.  Small pericardial effusion. 4.  Findings suggestive of medullary nephrocalcinosis. -------------------------------------------------------------------------------- ---------------- Fleischner Society 2017 Guidelines for Management of Incidentally Detected Pulmonary Nodules in Adults. (Solid Nodules) Nodule size < 6 mm (multiple) Low-Risk Patient - No routine follow-up High-Risk Patient - Optional CT at 12 months -------------------------------------------------------------------------------- ----------------      Assessment & Plan    Assessment   89 y.o. male being consulted for acute bronchitis with a asthma exacerbation.  Likely viral trigger, sick contacts.  COVID-negative.  At this point may have developed a secondary bacterial bronchitis.  Has minimal and insignificant pulmonary nodules, particularly at his advanced age.     Plan      -Continue steroids but will reduce dose and change to prednisone in case this is contributing to his agitation.  Insulin being adjusted by endocrinology and going back on an insulin drip.  Would plan to do quick taper due to side effects, 40mg tomorrow, 30mg Sunday, 20mg Monday, 10mg Tuesday and stop if he is improving.  -On  bronchodilators DuoNebs every 6 hours and budesonide twice daily  -Continue azithromycin for 5 days total is reasonable today but will be day 2 out of 5  -Sputum culture if he is able to produce.  -DVT prophylaxis with SQ heparin    More than 35 minutes were spent obtaining a detailed interval history, detailed exam, evaluating this high complexity case with significant medical decision making and coordination of care with primary team and consultants.          Klaudia Duarte MD

## 2022-04-29 NOTE — PLAN OF CARE
Problem: Adult Inpatient Plan of Care  Goal: Plan of Care Review  Outcome: Progressing  Flowsheets (Taken 4/29/2022 1932)  Outcome Summary: insulin gtt restrarted per Endo, RA, hydralazine prn for SBP>180, 1:1 at BS for safety and reorientation   Plan of Care Review  Outcome Summary: insulin gtt restrarted per Endo, RA, hydralazine prn for SBP>180, 1:1 at BS for safety and reorientation

## 2022-04-30 LAB
ANION GAP SERPL CALC-SCNC: 6 MEQ/L (ref 3–15)
ATRIAL RATE: 97
BUN SERPL-MCNC: 40 MG/DL (ref 8–20)
CALCIUM SERPL-MCNC: 8.5 MG/DL (ref 8.9–10.3)
CHLORIDE SERPL-SCNC: 105 MEQ/L (ref 98–109)
CO2 SERPL-SCNC: 24 MEQ/L (ref 22–32)
CREAT SERPL-MCNC: 1.3 MG/DL (ref 0.8–1.3)
ERYTHROCYTE [DISTWIDTH] IN BLOOD BY AUTOMATED COUNT: 12.2 % (ref 11.6–14.4)
GFR SERPL CREATININE-BSD FRML MDRD: 51.5 ML/MIN/1.73M*2
GLUCOSE BLD-MCNC: 103 MG/DL (ref 70–99)
GLUCOSE BLD-MCNC: 122 MG/DL (ref 70–99)
GLUCOSE BLD-MCNC: 126 MG/DL (ref 70–99)
GLUCOSE BLD-MCNC: 128 MG/DL (ref 70–99)
GLUCOSE BLD-MCNC: 134 MG/DL (ref 70–99)
GLUCOSE BLD-MCNC: 142 MG/DL (ref 70–99)
GLUCOSE BLD-MCNC: 147 MG/DL (ref 70–99)
GLUCOSE BLD-MCNC: 155 MG/DL (ref 70–99)
GLUCOSE BLD-MCNC: 162 MG/DL (ref 70–99)
GLUCOSE BLD-MCNC: 167 MG/DL (ref 70–99)
GLUCOSE BLD-MCNC: 178 MG/DL (ref 70–99)
GLUCOSE BLD-MCNC: 181 MG/DL (ref 70–99)
GLUCOSE BLD-MCNC: 184 MG/DL (ref 70–99)
GLUCOSE BLD-MCNC: 192 MG/DL (ref 70–99)
GLUCOSE BLD-MCNC: 62 MG/DL (ref 70–99)
GLUCOSE BLD-MCNC: 99 MG/DL (ref 70–99)
GLUCOSE SERPL-MCNC: 160 MG/DL (ref 70–99)
HCT VFR BLDCO AUTO: 33.4 % (ref 40.1–51)
HGB BLD-MCNC: 11.2 G/DL (ref 13.7–17.5)
MAGNESIUM SERPL-MCNC: 2.2 MG/DL (ref 1.8–2.5)
MCH RBC QN AUTO: 31.1 PG (ref 28–33.2)
MCHC RBC AUTO-ENTMCNC: 33.5 G/DL (ref 32.2–36.5)
MCV RBC AUTO: 92.8 FL (ref 83–98)
P AXIS: 80
PDW BLD AUTO: 9 FL (ref 9.4–12.4)
PLATELET # BLD AUTO: 255 K/UL (ref 150–350)
POCT TEST: ABNORMAL
POCT TEST: NORMAL
POTASSIUM SERPL-SCNC: 4 MEQ/L (ref 3.6–5.1)
PR INTERVAL: 168
QRS DURATION: 134
QT INTERVAL: 394
QTC CALCULATION(BAZETT): 500
R AXIS: -51
RBC # BLD AUTO: 3.6 M/UL (ref 4.5–5.8)
SODIUM SERPL-SCNC: 135 MEQ/L (ref 136–144)
T WAVE AXIS: 94
VENTRICULAR RATE: 97
WBC # BLD AUTO: 6.98 K/UL (ref 3.8–10.5)

## 2022-04-30 PROCEDURE — 83735 ASSAY OF MAGNESIUM: CPT | Performed by: FAMILY MEDICINE

## 2022-04-30 PROCEDURE — 25800000 HC PHARMACY IV SOLUTIONS: Performed by: PHYSICIAN ASSISTANT

## 2022-04-30 PROCEDURE — 80048 BASIC METABOLIC PNL TOTAL CA: CPT | Performed by: FAMILY MEDICINE

## 2022-04-30 PROCEDURE — 63700000 HC SELF-ADMINISTRABLE DRUG: Performed by: INTERNAL MEDICINE

## 2022-04-30 PROCEDURE — 25800000 HC PHARMACY IV SOLUTIONS: Performed by: INTERNAL MEDICINE

## 2022-04-30 PROCEDURE — 36415 COLL VENOUS BLD VENIPUNCTURE: CPT | Performed by: FAMILY MEDICINE

## 2022-04-30 PROCEDURE — 63700000 HC SELF-ADMINISTRABLE DRUG: Performed by: FAMILY MEDICINE

## 2022-04-30 PROCEDURE — 63600000 HC DRUGS/DETAIL CODE: Performed by: PHYSICIAN ASSISTANT

## 2022-04-30 PROCEDURE — 25000000 HC PHARMACY GENERAL: Performed by: INTERNAL MEDICINE

## 2022-04-30 PROCEDURE — 85027 COMPLETE CBC AUTOMATED: CPT | Performed by: FAMILY MEDICINE

## 2022-04-30 PROCEDURE — 63700000 HC SELF-ADMINISTRABLE DRUG: Performed by: PHYSICIAN ASSISTANT

## 2022-04-30 PROCEDURE — 63700000 HC SELF-ADMINISTRABLE DRUG: Performed by: HOSPITALIST

## 2022-04-30 PROCEDURE — 25000000 HC PHARMACY GENERAL: Performed by: PHYSICIAN ASSISTANT

## 2022-04-30 PROCEDURE — 99232 SBSQ HOSP IP/OBS MODERATE 35: CPT | Performed by: INTERNAL MEDICINE

## 2022-04-30 PROCEDURE — 63600000 HC DRUGS/DETAIL CODE: Performed by: INTERNAL MEDICINE

## 2022-04-30 PROCEDURE — 20600000 HC ROOM AND CARE INTERMEDIATE/TELEMETRY

## 2022-04-30 PROCEDURE — 94640 AIRWAY INHALATION TREATMENT: CPT

## 2022-04-30 PROCEDURE — 99233 SBSQ HOSP IP/OBS HIGH 50: CPT | Performed by: FAMILY MEDICINE

## 2022-04-30 RX ORDER — INSULIN GLARGINE 100 [IU]/ML
6 INJECTION, SOLUTION SUBCUTANEOUS NIGHTLY
Status: DISCONTINUED | OUTPATIENT
Start: 2022-04-30 | End: 2022-05-01

## 2022-04-30 RX ORDER — INSULIN ASPART 100 [IU]/ML
0-10 INJECTION, SOLUTION INTRAVENOUS; SUBCUTANEOUS
Status: DISCONTINUED | OUTPATIENT
Start: 2022-04-30 | End: 2022-05-02 | Stop reason: HOSPADM

## 2022-04-30 RX ORDER — INSULIN ASPART 100 [IU]/ML
10 INJECTION, SOLUTION INTRAVENOUS; SUBCUTANEOUS
Status: DISCONTINUED | OUTPATIENT
Start: 2022-04-30 | End: 2022-04-30

## 2022-04-30 RX ORDER — INSULIN GLARGINE 100 [IU]/ML
6 INJECTION, SOLUTION SUBCUTANEOUS EVERY MORNING
Status: DISCONTINUED | OUTPATIENT
Start: 2022-04-30 | End: 2022-05-01

## 2022-04-30 RX ORDER — INSULIN ASPART 100 [IU]/ML
7 INJECTION, SOLUTION INTRAVENOUS; SUBCUTANEOUS
Status: DISCONTINUED | OUTPATIENT
Start: 2022-05-01 | End: 2022-05-01

## 2022-04-30 RX ORDER — INSULIN ASPART 100 [IU]/ML
0-4 INJECTION, SOLUTION INTRAVENOUS; SUBCUTANEOUS EVERY 24 HOURS
Status: DISCONTINUED | OUTPATIENT
Start: 2022-05-01 | End: 2022-05-02

## 2022-04-30 RX ORDER — INSULIN ASPART 100 [IU]/ML
0-4 INJECTION, SOLUTION INTRAVENOUS; SUBCUTANEOUS NIGHTLY
Status: DISCONTINUED | OUTPATIENT
Start: 2022-04-30 | End: 2022-05-02 | Stop reason: HOSPADM

## 2022-04-30 RX ORDER — GUAIFENESIN 100 MG/5ML
200 SOLUTION ORAL EVERY 6 HOURS PRN
Status: DISCONTINUED | OUTPATIENT
Start: 2022-04-30 | End: 2022-05-02 | Stop reason: HOSPADM

## 2022-04-30 RX ADMIN — HEPARIN SODIUM 5000 UNITS: 5000 INJECTION, SOLUTION INTRAVENOUS; SUBCUTANEOUS at 04:06

## 2022-04-30 RX ADMIN — INSULIN GLARGINE 6 UNITS: 100 INJECTION, SOLUTION SUBCUTANEOUS at 10:14

## 2022-04-30 RX ADMIN — INSULIN GLARGINE 6 UNITS: 100 INJECTION, SOLUTION SUBCUTANEOUS at 22:16

## 2022-04-30 RX ADMIN — BENZONATATE 100 MG: 100 CAPSULE ORAL at 17:43

## 2022-04-30 RX ADMIN — GUAIFENESIN 200 MG: 200 SOLUTION ORAL at 22:15

## 2022-04-30 RX ADMIN — HEPARIN SODIUM 5000 UNITS: 5000 INJECTION, SOLUTION INTRAVENOUS; SUBCUTANEOUS at 20:57

## 2022-04-30 RX ADMIN — INSULIN ASPART 6 UNITS: 100 INJECTION, SOLUTION INTRAVENOUS; SUBCUTANEOUS at 08:57

## 2022-04-30 RX ADMIN — GUAIFENESIN 600 MG: 600 TABLET ORAL at 20:57

## 2022-04-30 RX ADMIN — BENZONATATE 100 MG: 100 CAPSULE ORAL at 11:48

## 2022-04-30 RX ADMIN — PREDNISONE 40 MG: 20 TABLET ORAL at 08:37

## 2022-04-30 RX ADMIN — AZITHROMYCIN MONOHYDRATE 500 MG: 500 INJECTION, POWDER, LYOPHILIZED, FOR SOLUTION INTRAVENOUS at 01:09

## 2022-04-30 RX ADMIN — IPRATROPIUM BROMIDE AND ALBUTEROL SULFATE 3 ML: 2.5; .5 SOLUTION RESPIRATORY (INHALATION) at 09:12

## 2022-04-30 RX ADMIN — INSULIN ASPART 10 UNITS: 100 INJECTION, SOLUTION INTRAVENOUS; SUBCUTANEOUS at 17:44

## 2022-04-30 RX ADMIN — PANTOPRAZOLE SODIUM 40 MG: 40 TABLET, DELAYED RELEASE ORAL at 08:37

## 2022-04-30 RX ADMIN — SODIUM CHLORIDE 1.3 UNITS/HR: 9 INJECTION, SOLUTION INTRAVENOUS at 10:06

## 2022-04-30 RX ADMIN — BENZONATATE 100 MG: 100 CAPSULE ORAL at 05:14

## 2022-04-30 RX ADMIN — AMLODIPINE BESYLATE 5 MG: 5 TABLET ORAL at 08:37

## 2022-04-30 RX ADMIN — GUAIFENESIN 600 MG: 600 TABLET ORAL at 08:37

## 2022-04-30 RX ADMIN — IPRATROPIUM BROMIDE AND ALBUTEROL SULFATE 3 ML: 2.5; .5 SOLUTION RESPIRATORY (INHALATION) at 14:54

## 2022-04-30 RX ADMIN — HEPARIN SODIUM 5000 UNITS: 5000 INJECTION, SOLUTION INTRAVENOUS; SUBCUTANEOUS at 11:50

## 2022-04-30 RX ADMIN — BUDESONIDE 0.5 MG: 0.5 INHALANT ORAL at 09:35

## 2022-04-30 NOTE — PLAN OF CARE
Problem: Adult Inpatient Plan of Care  Goal: Plan of Care Review  Outcome: Progressing  Flowsheets (Taken 4/30/2022 7507)  Progress: no change  Plan of Care Reviewed With: patient  Outcome Summary: BPs well controlled throughout shift without PRN meds. Insulin gtt infusing. Spoke with gordon HORN, verbal order given to start D5W@80 if BS falls below 100. BS fell to 87, and D5W started. Pt continues to be intermittently impulsive with confusion. Safety 1:1 at bedside.  Goal: Patient-Specific Goal (Individualized)  Outcome: Progressing  Goal: Absence of Hospital-Acquired Illness or Injury  Outcome: Progressing  Goal: Optimal Comfort and Wellbeing  Outcome: Progressing  Goal: Readiness for Transition of Care  Outcome: Progressing     Problem: Skin Injury Risk Increased  Goal: Skin Health and Integrity  Outcome: Progressing     Problem: Infection COPD (Chronic Obstructive Pulmonary Disease)  Goal: Absence of Infection Signs and Symptoms  Outcome: Progressing     Problem: Respiratory Compromise COPD (Chronic Obstructive Pulmonary Disease)  Goal: Effective Oxygenation and Ventilation  Outcome: Progressing     Problem: Violence Risk or Actual  Goal: Anger and Impulse Control  Outcome: Progressing     Problem: Fall Injury Risk  Goal: Absence of Fall and Fall-Related Injury  Outcome: Not progressing

## 2022-04-30 NOTE — PROGRESS NOTES
Hospital Medicine Service -  Daily Progress Note       SUBJECTIVE   Interval History:     No acute overnight events. Mr. Raman was seen and examined at bedside. He feels well and has no complaints. He is alert and oriented to person, place, time, and situation.      OBJECTIVE      Vital signs in last 24 hours:  Temp:  [36.3 °C (97.4 °F)-36.7 °C (98 °F)] 36.7 °C (98 °F)  Heart Rate:  [76-90] 76  Resp:  [18-20] 20  BP: (111-170)/(57-79) 126/79    Intake/Output Summary (Last 24 hours) at 4/30/2022 1541  Last data filed at 4/30/2022 1006  Gross per 24 hour   Intake 2040.31 ml   Output 1200 ml   Net 840.31 ml       PHYSICAL EXAMINATION      Physical Exam    General: No acute distress. Non toxic appearing.   HEENT: NC/AT  MMM  Respiratory: Scattered expiratory wheezes. No rhonchi or rales. Non labored breathing.   Cardiovascular: RRR. Normal S1 and S2.    Abdomen: Soft, non distended, non tender. Bowel sounds present.   Psychiatric: Calm and cooperative.    LINES, CATHETERS, DRAINS, AIRWAYS, AND WOUNDS   Lines, Drains, and Airways:  Wounds (agree with documentation and present on admission):  Peripheral IV (Adult) 04/27/22 Left;Posterior Forearm (Active)   Number of days: 3       Peripheral IV (Adult) 04/28/22 Anterior;Right Forearm (Active)   Number of days: 2           LABS / IMAGING / TELE      Labs    CBC Results       04/30/22 04/29/22 04/28/22     0509 1155 0445    WBC 6.98 6.61 11.52    RBC 3.60 4.18 3.87    HGB 11.2 12.9 11.7    HCT 33.4 40.0 36.6    MCV 92.8 95.7 94.6    MCH 31.1 30.9 30.2    MCHC 33.5 32.3 32.0     187 258        CMP Results       04/30/22 04/29/22 04/29/22     0509 1835 1319     133 129    K 4.0 4.5 5.3    Cl 105 100 98    CO2 24 22 22    Glucose 160 383 513    BUN 40 48 49    Creatinine 1.3 1.5 1.5    Calcium 8.5 8.7 8.5    Anion Gap 6 11 9    EGFR 51.5 43.7 43.7            SARS-CoV-2 (COVID-19) (no units)   Date/Time Value   04/26/2022 2135 Negative          ASSESSMENT AND  PLAN      Acute bronchitis  Assessment & Plan  - Acute bronchitis + asthma exacerbation   - Pulmonology consulted, recommendations appreciated   - Steroids were held due to uncontrolled blood glucose but then resumed due to respiratory status  - Rapid steroid taper. Prednisone 40 x 1 day, 30 x 1, 20 x 1, 10 x 1   - Discussed with pulmonology   - Endocrinology consulted, recommendations appreciated.          Delirium  Assessment & Plan  - UA negative for infection   - Exacerbated by steroids.   - Plan on quick taper   - Avoid QT prolonging medications     COPD (chronic obstructive pulmonary disease) (CMS/[facility])  Assessment & Plan  - See acute bronchitis         Diabetes mellitus type I (CMS/[facility])  Assessment & Plan  - Remain in PCU   - Off insulin drip now on subcutaneous insulin   - Endocrine following  - Will continue to monitor closely       Pulmonary nodule  Assessment & Plan  - CXR with right-sided pulmonary nodule, no prior imaging for comparison  - Labs significant for hyponatremia  - CT chest (4/27/22): Few small pulmonary nodules measuring up to 4 mm.  Diffuse peribronchial thickening.   Cardiomegaly.  Small pericardial effusion. Findings suggestive of medullary nephrocalcinosis.   - Per pulmonology has minimal and insignificant pulmonary nodules, particularly at his advanced age.        HLD (hyperlipidemia)  Assessment & Plan  - Not on home regimen    Stage 3 chronic kidney disease (CMS/[facility])  Assessment & Plan  - Renal function stable at baseline    * Hyponatremia  Assessment & Plan  - Mostly due to hyperglycemia   - Will continue to monitor     Prolonged Q-T interval on ECG  Assessment & Plan  - EKG (4/26/22): QTc 499   - Recheck EKG          VTE Assessment: Padua    VTE Prophylaxis:  Current anticoagulants:  heparin (porcine) 5,000 unit/mL injection 5,000 Units, subcutaneous, q8h INT      Code Status: DNR (A.N.D.)      Estimated Discharge Date: 5/2/2022           Agustin Aj MD  4/30/2022

## 2022-04-30 NOTE — PROGRESS NOTES
Endocrinology Progress Note    Darron Raman   [id number]   12/1/1928   Admission date: 4/26/2022    Interval history:   Confused   Cough    40 mg on prednisone. On taper of prednisone   On [address]   Appetite great   [place] of hearing   This am agitated because too long to get rice krispies   On 1:1      Blood Glucose results   Results from last 7 days   Lab Units 04/30/22  0855 04/30/22  0755 04/30/22  0657 04/30/22  0557 04/30/22  0500 04/30/22  0400 04/30/22  0302   POCT GLUCOSE mg/dL 167* 181* 162* 155* 147* 134* 128*        Lab Results   Component Value Date    GLUCOSE 160 (H) 04/30/2022    CALCIUM 8.5 (L) 04/30/2022     (L) 04/30/2022    K 4.0 04/30/2022    CO2 24 04/30/2022     04/30/2022    BUN 40 (H) 04/30/2022    CREATININE 1.3 04/30/2022   No results found for: HGBA1C  Current Meds  •  acetaminophen, 650 mg, oral, q4h PRN  •  amLODIPine, 5 mg, oral, Daily  •  atropine, 0.5 mg, intravenous, q5 min PRN  •  azithromycin, 500 mg, intravenous, q24h INT  •  benzonatate, 100 mg, oral, q6h not 12m  •  budesonide, 0.5 mg, nebulization, BID (6a, 6p)  •  glucose, 16-32 g of dextrose, oral, PRN **OR** dextrose, 15-30 g of dextrose, oral, PRN **OR** glucagon, 1 mg, intramuscular, PRN **OR** dextrose in water, 25 mL, intravenous, PRN  •  dextrose 5 %, , intravenous, Continuous  •  insulin, 0-15 Units/hr, intravenous, Titrated **AND** dextrose in water, 10-30 mL, intravenous, PRN  •  guaiFENesin, 600 mg, oral, BID  •  heparin (porcine), 5,000 Units, subcutaneous, q8h INT  •  hydrALAZINE, 10 mg, intravenous, q8h PRN  •  insulin aspart U-100, 6 Units, subcutaneous, TID with meals  •  ipratropium-albuteroL, 3 mL, nebulization, q6h NETTIE  •  nitroglycerin, 0.4 mg, sublingual, q5 min PRN  •  pantoprazole, 40 mg, oral, Daily  •  [COMPLETED] predniSONE, 40 mg, oral, Daily **FOLLOWED BY** [START ON 5/1/2022] predniSONE, 30 mg, oral, Daily **FOLLOWED BY** [START ON 5/2/2022] predniSONE, 20 mg, oral, Daily  **FOLLOWED BY** [START ON 5/3/2022] predniSONE, 10 mg, oral, Daily        Physical Exam  Temp:  [36.3 °C (97.4 °F)-36.7 °C (98 °F)] 36.7 °C (98 °F)  Heart Rate:  [76-94] 76  Resp:  [18-20] 20  BP: (111-182)/(57-96) 126/79    General appearance: alert and no distress  Head: normocephalic, without obvious abnormality  Lungs: scattered expiratory wheeze  Heart: regular rate and rhythm  Abdomen: soft, non-tender; bowel sounds normal  Extremities: no edema  Neurologic: confused      LABS reviewed   I have reviewed the patient's labs.    Results from last 7 days   Lab Units 04/30/22  0509 04/29/22  1835 04/29/22  1319 04/27/22  0219 04/26/22  2130   SODIUM mEQ/L 135* 133* 129*   < > 126*   POTASSIUM mEQ/L 4.0 4.5 5.3*   < > 5.6*   CHLORIDE mEQ/L 105 100 98   < > 94*   CO2 mEQ/L 24 22 22   < > 23   BUN mg/dL 40* 48* 49*   < > 30*   CREATININE mg/dL 1.3 1.5* 1.5*   < > 1.3   CALCIUM mg/dL 8.5* 8.7* 8.5*   < > 8.4*   ALBUMIN g/dL  --   --   --   --  3.4   BILIRUBIN TOTAL mg/dL  --   --   --   --  1.6*   ALK PHOS IU/L  --   --   --   --  96   ALT IU/L  --   --   --   --  14*   AST IU/L  --   --   --   --  30   GLUCOSE mg/dL 160* 383* 513*   < > 314*    < > = values in this interval not displayed.                 No results found for: HGBA1C  Lab Results   Component Value Date    CREATININE 1.3 04/30/2022     Estimated Creatinine Clearance: 37.6 mL/min (by C-G formula based on SCr of 1.3 mg/dL).      Assessment/Plan:   Diabetes mellitus: type 1 with steroid induced hyperglycemia. He is admitted with acute bronchitis and was given solumedrol 125 mg on admission.   Home dose is 4 units Lantus twice daily and 4 units Novolog with meals.   At home, he is on lantus 4 units bid and novolog 4 units tid meal. His A1c was recently 9.5% per pt's daughter; had been under 7% prior to that. He has had DM for 50 years and has been able to manage well until recently. Sees Dr Garnett as outpt. He has lonny.     He was placed on insulin drip  on 2/27 since his BG had spiked to 600s. He also had missed 2 doses of lantus. Insulin drip was stopped when BG dropped to the 50s. He was then started on D5. BG luis a to 200s. Then  twice daily Lantus yesterday, but Solumedrol 30 mg twice daily started again last night. Yesterday   and luis a to 513 after second dose of Solumedrol despite increase in SQ insulin. Resumed insulin infusion yesterday     Now on  Prednisone 40 mg daily in AM  with taper as outpatient.  restart  Lantus 6 units  twice daily in AM and    Novolog 10 units with meals while on Prednisone. - but as tapered and depending on bg will need adjustment + scale > 200 wc 2units/50 mg/dl  and at hs > 250   Given age not want strict control.   Will need continued adjustment as outpatient.      Daisy Slaughter MD

## 2022-05-01 LAB
ANION GAP SERPL CALC-SCNC: 8 MEQ/L (ref 3–15)
BUN SERPL-MCNC: 33 MG/DL (ref 8–20)
CALCIUM SERPL-MCNC: 8.4 MG/DL (ref 8.9–10.3)
CHLORIDE SERPL-SCNC: 101 MEQ/L (ref 98–109)
CO2 SERPL-SCNC: 24 MEQ/L (ref 22–32)
CREAT SERPL-MCNC: 1.4 MG/DL (ref 0.8–1.3)
ERYTHROCYTE [DISTWIDTH] IN BLOOD BY AUTOMATED COUNT: 12.4 % (ref 11.6–14.4)
GFR SERPL CREATININE-BSD FRML MDRD: 47.3 ML/MIN/1.73M*2
GLUCOSE BLD-MCNC: 101 MG/DL (ref 70–99)
GLUCOSE BLD-MCNC: 221 MG/DL (ref 70–99)
GLUCOSE BLD-MCNC: 248 MG/DL (ref 70–99)
GLUCOSE BLD-MCNC: 286 MG/DL (ref 70–99)
GLUCOSE BLD-MCNC: 297 MG/DL (ref 70–99)
GLUCOSE SERPL-MCNC: 304 MG/DL (ref 70–99)
HCT VFR BLDCO AUTO: 36.2 % (ref 40.1–51)
HGB BLD-MCNC: 11.8 G/DL (ref 13.7–17.5)
MCH RBC QN AUTO: 30.6 PG (ref 28–33.2)
MCHC RBC AUTO-ENTMCNC: 32.6 G/DL (ref 32.2–36.5)
MCV RBC AUTO: 94 FL (ref 83–98)
PDW BLD AUTO: 9.4 FL (ref 9.4–12.4)
PLATELET # BLD AUTO: 259 K/UL (ref 150–350)
POCT TEST: ABNORMAL
POTASSIUM SERPL-SCNC: 4.7 MEQ/L (ref 3.6–5.1)
RBC # BLD AUTO: 3.85 M/UL (ref 4.5–5.8)
SODIUM SERPL-SCNC: 133 MEQ/L (ref 136–144)
WBC # BLD AUTO: 5.97 K/UL (ref 3.8–10.5)

## 2022-05-01 PROCEDURE — 63700000 HC SELF-ADMINISTRABLE DRUG: Performed by: FAMILY MEDICINE

## 2022-05-01 PROCEDURE — 25000000 HC PHARMACY GENERAL: Performed by: PHYSICIAN ASSISTANT

## 2022-05-01 PROCEDURE — 25800000 HC PHARMACY IV SOLUTIONS: Performed by: PHYSICIAN ASSISTANT

## 2022-05-01 PROCEDURE — 85027 COMPLETE CBC AUTOMATED: CPT | Performed by: FAMILY MEDICINE

## 2022-05-01 PROCEDURE — 99232 SBSQ HOSP IP/OBS MODERATE 35: CPT | Performed by: INTERNAL MEDICINE

## 2022-05-01 PROCEDURE — 94640 AIRWAY INHALATION TREATMENT: CPT

## 2022-05-01 PROCEDURE — 63700000 HC SELF-ADMINISTRABLE DRUG: Performed by: INTERNAL MEDICINE

## 2022-05-01 PROCEDURE — 25000000 HC PHARMACY GENERAL: Performed by: INTERNAL MEDICINE

## 2022-05-01 PROCEDURE — 63600000 HC DRUGS/DETAIL CODE: Performed by: PHYSICIAN ASSISTANT

## 2022-05-01 PROCEDURE — 99233 SBSQ HOSP IP/OBS HIGH 50: CPT | Performed by: FAMILY MEDICINE

## 2022-05-01 PROCEDURE — 12000000 HC ROOM AND CARE MED/SURG

## 2022-05-01 PROCEDURE — 63700000 HC SELF-ADMINISTRABLE DRUG: Performed by: HOSPITALIST

## 2022-05-01 PROCEDURE — 80048 BASIC METABOLIC PNL TOTAL CA: CPT | Performed by: FAMILY MEDICINE

## 2022-05-01 PROCEDURE — 36415 COLL VENOUS BLD VENIPUNCTURE: CPT | Performed by: FAMILY MEDICINE

## 2022-05-01 RX ORDER — INSULIN ASPART 100 [IU]/ML
6 INJECTION, SOLUTION INTRAVENOUS; SUBCUTANEOUS
Status: DISCONTINUED | OUTPATIENT
Start: 2022-05-01 | End: 2022-05-02 | Stop reason: HOSPADM

## 2022-05-01 RX ORDER — INSULIN GLARGINE 100 [IU]/ML
4 INJECTION, SOLUTION SUBCUTANEOUS EVERY MORNING
Status: DISCONTINUED | OUTPATIENT
Start: 2022-05-02 | End: 2022-05-02 | Stop reason: HOSPADM

## 2022-05-01 RX ORDER — INSULIN GLARGINE 100 [IU]/ML
4 INJECTION, SOLUTION SUBCUTANEOUS NIGHTLY
Status: DISCONTINUED | OUTPATIENT
Start: 2022-05-01 | End: 2022-05-02 | Stop reason: HOSPADM

## 2022-05-01 RX ADMIN — BUDESONIDE 0.5 MG: 0.5 INHALANT ORAL at 21:00

## 2022-05-01 RX ADMIN — PANTOPRAZOLE SODIUM 40 MG: 40 TABLET, DELAYED RELEASE ORAL at 08:27

## 2022-05-01 RX ADMIN — AMLODIPINE BESYLATE 5 MG: 5 TABLET ORAL at 08:28

## 2022-05-01 RX ADMIN — PREDNISONE 30 MG: 5 TABLET ORAL at 08:27

## 2022-05-01 RX ADMIN — HEPARIN SODIUM 5000 UNITS: 5000 INJECTION, SOLUTION INTRAVENOUS; SUBCUTANEOUS at 20:59

## 2022-05-01 RX ADMIN — INSULIN GLARGINE 6 UNITS: 100 INJECTION, SOLUTION SUBCUTANEOUS at 08:32

## 2022-05-01 RX ADMIN — BENZONATATE 100 MG: 100 CAPSULE ORAL at 17:10

## 2022-05-01 RX ADMIN — INSULIN ASPART 4 UNITS: 100 INJECTION, SOLUTION INTRAVENOUS; SUBCUTANEOUS at 08:23

## 2022-05-01 RX ADMIN — IPRATROPIUM BROMIDE AND ALBUTEROL SULFATE 3 ML: 2.5; .5 SOLUTION RESPIRATORY (INHALATION) at 14:16

## 2022-05-01 RX ADMIN — AZITHROMYCIN MONOHYDRATE 500 MG: 500 INJECTION, POWDER, LYOPHILIZED, FOR SOLUTION INTRAVENOUS at 00:57

## 2022-05-01 RX ADMIN — GUAIFENESIN 200 MG: 200 SOLUTION ORAL at 05:26

## 2022-05-01 RX ADMIN — BENZONATATE 100 MG: 100 CAPSULE ORAL at 13:11

## 2022-05-01 RX ADMIN — BENZONATATE 100 MG: 100 CAPSULE ORAL at 05:26

## 2022-05-01 RX ADMIN — INSULIN ASPART 6 UNITS: 100 INJECTION, SOLUTION INTRAVENOUS; SUBCUTANEOUS at 17:09

## 2022-05-01 RX ADMIN — BUDESONIDE 0.5 MG: 0.5 INHALANT ORAL at 07:53

## 2022-05-01 RX ADMIN — HEPARIN SODIUM 5000 UNITS: 5000 INJECTION, SOLUTION INTRAVENOUS; SUBCUTANEOUS at 03:27

## 2022-05-01 RX ADMIN — INSULIN ASPART 1 UNITS: 100 INJECTION, SOLUTION INTRAVENOUS; SUBCUTANEOUS at 23:28

## 2022-05-01 RX ADMIN — IPRATROPIUM BROMIDE AND ALBUTEROL SULFATE 3 ML: 2.5; .5 SOLUTION RESPIRATORY (INHALATION) at 21:00

## 2022-05-01 RX ADMIN — INSULIN ASPART 2 UNITS: 100 INJECTION, SOLUTION INTRAVENOUS; SUBCUTANEOUS at 17:08

## 2022-05-01 RX ADMIN — IPRATROPIUM BROMIDE AND ALBUTEROL SULFATE 3 ML: 2.5; .5 SOLUTION RESPIRATORY (INHALATION) at 07:53

## 2022-05-01 RX ADMIN — INSULIN ASPART 7 UNITS: 100 INJECTION, SOLUTION INTRAVENOUS; SUBCUTANEOUS at 08:24

## 2022-05-01 RX ADMIN — INSULIN GLARGINE 4 UNITS: 100 INJECTION, SOLUTION SUBCUTANEOUS at 23:27

## 2022-05-01 NOTE — PROGRESS NOTES
Endocrinology Progress Note    Darron Raman   [id number]   12/1/1928   Admission date: 4/26/2022    Interval history:   Spoke to RN ate 100% meal   [address] no   30 mg on prednisone today . On taper of prednisone   Appetite great   [place] of hearing     + cough     Yesterday lunch dose held because prelunch low and ate less carbs   Dinner 10 units given and I decreased to 7 wc today because steroids tapered         Blood Glucose results   Results from last 7 days   Lab Units 05/01/22  0757 05/01/22  0329 04/30/22  2215 04/30/22  1645 04/30/22  1405 04/30/22  1230 04/30/22  1056   POCT GLUCOSE mg/dL 286* 248* 178* 184* 99 62* 122*        Lab Results   Component Value Date    GLUCOSE 304 (H) 05/01/2022    CALCIUM 8.4 (L) 05/01/2022     (L) 05/01/2022    K 4.7 05/01/2022    CO2 24 05/01/2022     05/01/2022    BUN 33 (H) 05/01/2022    CREATININE 1.4 (H) 05/01/2022   No results found for: HGBA1C  Current Meds  •  acetaminophen, 650 mg, oral, q4h PRN  •  amLODIPine, 5 mg, oral, Daily  •  atropine, 0.5 mg, intravenous, q5 min PRN  •  azithromycin, 500 mg, intravenous, q24h INT  •  benzonatate, 100 mg, oral, q6h not 12m  •  budesonide, 0.5 mg, nebulization, BID (6a, 6p)  •  glucose, 16-32 g of dextrose, oral, PRN **OR** dextrose, 15-30 g of dextrose, oral, PRN **OR** glucagon, 1 mg, intramuscular, PRN **OR** dextrose in water, 25 mL, intravenous, PRN  •  guaiFENesin, 200 mg, oral, q6h PRN  •  heparin (porcine), 5,000 Units, subcutaneous, q8h INT  •  hydrALAZINE, 10 mg, intravenous, q8h PRN  •  insulin aspart U-100, 0-10 Units, subcutaneous, TID with meals  •  insulin aspart U-100, 0-4 Units, subcutaneous, Nightly  •  insulin aspart U-100, 0-4 Units, subcutaneous, q24h  •  insulin aspart U-100, 7 Units, subcutaneous, TID with meals  •  insulin glargine U-100, 6 Units, subcutaneous, Nightly  •  insulin glargine U-100, 6 Units, subcutaneous, q AM  •  ipratropium-albuteroL, 3 mL, nebulization, q6h NETTIE  •   nitroglycerin, 0.4 mg, sublingual, q5 min PRN  •  pantoprazole, 40 mg, oral, Daily  •  [COMPLETED] predniSONE, 40 mg, oral, Daily **FOLLOWED BY** [COMPLETED] predniSONE, 30 mg, oral, Daily **FOLLOWED BY** [START ON 5/2/2022] predniSONE, 20 mg, oral, Daily **FOLLOWED BY** [START ON 5/3/2022] predniSONE, 10 mg, oral, Daily        Physical Exam  Temp:  [36.7 °C (98 °F)-36.8 °C (98.3 °F)] 36.7 °C (98 °F)  Heart Rate:  [76-93] 78  Resp:  [16-18] 16  BP: (128-165)/(65-90) 162/90    General appearance: alert and no distress  Head: normocephalic, without obvious abnormality  Lungs: scattered expiratory wheeze  Heart: regular rate and rhythm  Abdomen: soft, non-tender; bowel sounds normal  Extremities: no edema        LABS reviewed   I have reviewed the patient's labs.    Results from last 7 days   Lab Units 05/01/22  0534 04/30/22  0509 04/29/22  1835 04/27/22  0219 04/26/22  2130   SODIUM mEQ/L 133* 135* 133*   < > 126*   POTASSIUM mEQ/L 4.7 4.0 4.5   < > 5.6*   CHLORIDE mEQ/L 101 105 100   < > 94*   CO2 mEQ/L 24 24 22   < > 23   BUN mg/dL 33* 40* 48*   < > 30*   CREATININE mg/dL 1.4* 1.3 1.5*   < > 1.3   CALCIUM mg/dL 8.4* 8.5* 8.7*   < > 8.4*   ALBUMIN g/dL  --   --   --   --  3.4   BILIRUBIN TOTAL mg/dL  --   --   --   --  1.6*   ALK PHOS IU/L  --   --   --   --  96   ALT IU/L  --   --   --   --  14*   AST IU/L  --   --   --   --  30   GLUCOSE mg/dL 304* 160* 383*   < > 314*    < > = values in this interval not displayed.                 No results found for: HGBA1C  Lab Results   Component Value Date    CREATININE 1.4 (H) 05/01/2022     Estimated Creatinine Clearance: 34.9 mL/min (A) (by C-G formula based on SCr of 1.4 mg/dL (H)).      Assessment/Plan:   Diabetes mellitus: type 1 with steroid induced hyperglycemia. He is admitted with acute bronchitis and was given solumedrol 125 mg on admission.   Home dose is 4 units Lantus twice daily and 4 units Novolog with meals.   At home, he is on lantus 4 units bid and novolog  4 units tid meal. His A1c was recently 9.5% per pt's daughter; had been under 7% prior to that. He has had DM for 50 years and has been able to manage well until recently. Sees Dr Garnett as outpt. He has lonny.     He was placed on insulin drip on 2/27 since his BG had spiked to 600s. He also had missed 2 doses of lantus. Insulin drip was stopped when BG dropped to the 50s. He was then started on D5. BG luis a to 200s. Then  twice daily Lantus yesterday, but Solumedrol 30 mg twice daily started again last night. Yesterday   and luis a to 513 after second dose of Solumedrol despite increase in SQ insulin.    Now on  Prednisone 30 mg daily in AM  with taper as outpatient.  Since prednisone dose decreased will change   Lantus 4 units  twice daily in AM and hs (home dose)    Change  Novolog 6 units with meals while on Prednisone.    novolog scale > 200 wc 2units/50 mg/dl  and at hs > 250   Given age not want strict control.   Will need continued adjustment as outpatient.      Daisy Slaughter MD

## 2022-05-01 NOTE — PROGRESS NOTES
Hospital Medicine Service -  Daily Progress Note       SUBJECTIVE   Interval History:     No acute overnight events. Mr. Rob was seen and examined at bedside. He feels well and has no complaints.      OBJECTIVE      Vital signs in last 24 hours:  Temp:  [36.6 °C (97.9 °F)-36.8 °C (98.3 °F)] 36.7 °C (98 °F)  Heart Rate:  [73-93] 73  Resp:  [16-18] 18  BP: (128-190)/(60-94) 165/77    Intake/Output Summary (Last 24 hours) at 5/1/2022 1446  Last data filed at 5/1/2022 1200  Gross per 24 hour   Intake 1290 ml   Output 600 ml   Net 690 ml       PHYSICAL EXAMINATION      Physical Exam      General: No acute distress. Non toxic appearing.   HEENT: NC/AT  MMM  Respiratory: Scattered expiratory wheezes. No rhonchi or rales. Non labored breathing.   Cardiovascular: RRR. Normal S1 and S2.    Abdomen: Soft, non distended, non tender. Bowel sounds present.   Psychiatric: Calm and cooperative.    LINES, CATHETERS, DRAINS, AIRWAYS, AND WOUNDS   Lines, Drains, and Airways:  Wounds (agree with documentation and present on admission):  Peripheral IV (Adult) 04/27/22 Left;Posterior Forearm (Active)   Number of days: 4       Peripheral IV (Adult) 04/28/22 Anterior;Right Forearm (Active)   Number of days: 3          LABS / IMAGING / TELE      Labs    CBC Results       05/01/22 04/30/22 04/29/22     0534 0509 1155    WBC 5.97 6.98 6.61    RBC 3.85 3.60 4.18    HGB 11.8 11.2 12.9    HCT 36.2 33.4 40.0    MCV 94.0 92.8 95.7    MCH 30.6 31.1 30.9    MCHC 32.6 33.5 32.3     255 187        CMP Results       05/01/22 04/30/22 04/29/22     0534 0509 1835     135 133    K 4.7 4.0 4.5    Cl 101 105 100    CO2 24 24 22    Glucose 304 160 383    BUN 33 40 48    Creatinine 1.4 1.3 1.5    Calcium 8.4 8.5 8.7    Anion Gap 8 6 11    EGFR 47.3 51.5 43.7            SARS-CoV-2 (COVID-19) (no units)   Date/Time Value   04/26/2022 2138 Negative            ASSESSMENT AND PLAN      Acute bronchitis  Assessment & Plan  - Acute bronchitis +  asthma exacerbation   - Pulmonology consulted, recommendations appreciated   - Steroids were held due to uncontrolled blood glucose but then resumed due to respiratory status  - Rapid steroid taper. Prednisone 40 x 1 day, 30 x 1, 20 x 1, 10 x 1   - Discussed with pulmonology   - Endocrinology consulted, recommendations appreciated.          Delirium  Assessment & Plan  - UA negative for infection   - Exacerbated by steroids.   - Plan on quick taper   - Avoid QT prolonging medications     COPD (chronic obstructive pulmonary disease) (CMS/[facility])  Assessment & Plan  - See acute bronchitis         Diabetes mellitus type I (CMS/[facility])  Assessment & Plan     - Off insulin drip now on subcutaneous insulin   - Endocrine following  - Will continue to monitor closely       Pulmonary nodule  Assessment & Plan  - CXR with right-sided pulmonary nodule, no prior imaging for comparison  - Labs significant for hyponatremia  - CT chest (4/27/22): Few small pulmonary nodules measuring up to 4 mm.  Diffuse peribronchial thickening.   Cardiomegaly.  Small pericardial effusion. Findings suggestive of medullary nephrocalcinosis.   - Per pulmonology has minimal and insignificant pulmonary nodules, particularly at his advanced age.        HLD (hyperlipidemia)  Assessment & Plan  - Not on home regimen    Stage 3 chronic kidney disease (CMS/[facility])  Assessment & Plan  - Renal function stable at baseline    * Hyponatremia  Assessment & Plan  - Mostly due to hyperglycemia   - Will continue to monitor     Prolonged Q-T interval on ECG  Assessment & Plan  - EKG (4/26/22): QTc 499   - Recheck EKG          VTE Assessment: [place]    VTE Prophylaxis:  Current anticoagulants:  heparin (porcine) 5,000 unit/mL injection 5,000 Units, subcutaneous, q8h INT      Code Status: DNR (A.N.D.)      Estimated Discharge Date: 5/2/2022           Agustin Aj MD  5/1/2022

## 2022-05-02 VITALS
HEIGHT: 71 IN | RESPIRATION RATE: 18 BRPM | OXYGEN SATURATION: 98 % | HEART RATE: 74 BPM | SYSTOLIC BLOOD PRESSURE: 125 MMHG | WEIGHT: 165 LBS | DIASTOLIC BLOOD PRESSURE: 67 MMHG | BODY MASS INDEX: 23.1 KG/M2 | TEMPERATURE: 98.1 F

## 2022-05-02 LAB
ANION GAP SERPL CALC-SCNC: 10 MEQ/L (ref 3–15)
BUN SERPL-MCNC: 34 MG/DL (ref 8–20)
CALCIUM SERPL-MCNC: 8.4 MG/DL (ref 8.9–10.3)
CHLORIDE SERPL-SCNC: 98 MEQ/L (ref 98–109)
CO2 SERPL-SCNC: 23 MEQ/L (ref 22–32)
CREAT SERPL-MCNC: 1.6 MG/DL (ref 0.8–1.3)
ERYTHROCYTE [DISTWIDTH] IN BLOOD BY AUTOMATED COUNT: 12.2 % (ref 11.6–14.4)
GFR SERPL CREATININE-BSD FRML MDRD: 40.5 ML/MIN/1.73M*2
GLUCOSE BLD-MCNC: 215 MG/DL (ref 70–99)
GLUCOSE BLD-MCNC: 305 MG/DL (ref 70–99)
GLUCOSE SERPL-MCNC: 367 MG/DL (ref 70–99)
HCT VFR BLDCO AUTO: 38.1 % (ref 40.1–51)
HGB BLD-MCNC: 12.6 G/DL (ref 13.7–17.5)
MCH RBC QN AUTO: 30.8 PG (ref 28–33.2)
MCHC RBC AUTO-ENTMCNC: 33.1 G/DL (ref 32.2–36.5)
MCV RBC AUTO: 93.2 FL (ref 83–98)
PDW BLD AUTO: 9.4 FL (ref 9.4–12.4)
PLATELET # BLD AUTO: 296 K/UL (ref 150–350)
POCT TEST: ABNORMAL
POCT TEST: ABNORMAL
POTASSIUM SERPL-SCNC: 4.8 MEQ/L (ref 3.6–5.1)
RBC # BLD AUTO: 4.09 M/UL (ref 4.5–5.8)
SODIUM SERPL-SCNC: 131 MEQ/L (ref 136–144)
WBC # BLD AUTO: 7.42 K/UL (ref 3.8–10.5)

## 2022-05-02 PROCEDURE — 63700000 HC SELF-ADMINISTRABLE DRUG: Performed by: FAMILY MEDICINE

## 2022-05-02 PROCEDURE — 85027 COMPLETE CBC AUTOMATED: CPT | Performed by: FAMILY MEDICINE

## 2022-05-02 PROCEDURE — 80048 BASIC METABOLIC PNL TOTAL CA: CPT | Performed by: FAMILY MEDICINE

## 2022-05-02 PROCEDURE — 25000000 HC PHARMACY GENERAL: Performed by: INTERNAL MEDICINE

## 2022-05-02 PROCEDURE — 97162 PT EVAL MOD COMPLEX 30 MIN: CPT | Mod: GP

## 2022-05-02 PROCEDURE — 25000000 HC PHARMACY GENERAL: Performed by: PHYSICIAN ASSISTANT

## 2022-05-02 PROCEDURE — 25800000 HC PHARMACY IV SOLUTIONS: Performed by: PHYSICIAN ASSISTANT

## 2022-05-02 PROCEDURE — 63600000 HC DRUGS/DETAIL CODE: Performed by: PHYSICIAN ASSISTANT

## 2022-05-02 PROCEDURE — 99239 HOSP IP/OBS DSCHRG MGMT >30: CPT | Performed by: INTERNAL MEDICINE

## 2022-05-02 PROCEDURE — 36415 COLL VENOUS BLD VENIPUNCTURE: CPT | Performed by: FAMILY MEDICINE

## 2022-05-02 PROCEDURE — 63700000 HC SELF-ADMINISTRABLE DRUG: Performed by: HOSPITALIST

## 2022-05-02 RX ORDER — INSULIN ASPART 100 [IU]/ML
6 INJECTION, SOLUTION INTRAVENOUS; SUBCUTANEOUS
Qty: 5.4 ML | Refills: 0
Start: 2022-05-02 | End: 2022-06-01

## 2022-05-02 RX ORDER — BUDESONIDE AND FORMOTEROL FUMARATE DIHYDRATE 80; 4.5 UG/1; UG/1
2 AEROSOL RESPIRATORY (INHALATION)
Qty: 6.9 G | Refills: 0 | Status: SHIPPED | OUTPATIENT
Start: 2022-05-02 | End: 2022-05-02 | Stop reason: SDUPTHER

## 2022-05-02 RX ORDER — AMLODIPINE BESYLATE 5 MG/1
5 TABLET ORAL DAILY
Qty: 30 TABLET | Refills: 0 | Status: SHIPPED | OUTPATIENT
Start: 2022-05-03 | End: 2022-05-02 | Stop reason: SDUPTHER

## 2022-05-02 RX ORDER — AMLODIPINE BESYLATE 5 MG/1
5 TABLET ORAL DAILY
Qty: 30 TABLET | Refills: 0 | Status: SHIPPED | OUTPATIENT
Start: 2022-05-03 | End: 2022-06-02

## 2022-05-02 RX ORDER — PREDNISONE 10 MG/1
10 TABLET ORAL DAILY
Qty: 1 TABLET | Refills: 0 | Status: SHIPPED | OUTPATIENT
Start: 2022-05-03 | End: 2022-05-04

## 2022-05-02 RX ORDER — BUDESONIDE AND FORMOTEROL FUMARATE DIHYDRATE 80; 4.5 UG/1; UG/1
2 AEROSOL RESPIRATORY (INHALATION)
Qty: 6.9 G | Refills: 0 | Status: SHIPPED | OUTPATIENT
Start: 2022-05-02 | End: 2022-06-01

## 2022-05-02 RX ORDER — INSULIN ASPART 100 [IU]/ML
0-10 INJECTION, SOLUTION INTRAVENOUS; SUBCUTANEOUS
Qty: 5 PEN | Refills: 5
Start: 2022-05-02 | End: 2022-07-30

## 2022-05-02 RX ORDER — ALBUTEROL SULFATE 90 UG/1
2 INHALANT RESPIRATORY (INHALATION) EVERY 4 HOURS PRN
Qty: 8.5 G | Refills: 0 | Status: SHIPPED | OUTPATIENT
Start: 2022-05-02 | End: 2022-06-01

## 2022-05-02 RX ORDER — GUAIFENESIN 600 MG/1
600 TABLET, EXTENDED RELEASE ORAL 2 TIMES DAILY
Qty: 6 TABLET | Refills: 0
Start: 2022-05-02 | End: 2022-05-05

## 2022-05-02 RX ORDER — ALBUTEROL SULFATE 90 UG/1
2 INHALANT RESPIRATORY (INHALATION) EVERY 4 HOURS PRN
Qty: 8.5 G | Refills: 0 | Status: SHIPPED | OUTPATIENT
Start: 2022-05-02 | End: 2022-05-02 | Stop reason: SDUPTHER

## 2022-05-02 RX ORDER — PREDNISONE 10 MG/1
10 TABLET ORAL DAILY
Qty: 1 TABLET | Refills: 0 | Status: SHIPPED | OUTPATIENT
Start: 2022-05-03 | End: 2022-05-02 | Stop reason: SDUPTHER

## 2022-05-02 RX ADMIN — IPRATROPIUM BROMIDE AND ALBUTEROL SULFATE 3 ML: 2.5; .5 SOLUTION RESPIRATORY (INHALATION) at 15:05

## 2022-05-02 RX ADMIN — AZITHROMYCIN MONOHYDRATE 500 MG: 500 INJECTION, POWDER, LYOPHILIZED, FOR SOLUTION INTRAVENOUS at 01:45

## 2022-05-02 RX ADMIN — BENZONATATE 100 MG: 100 CAPSULE ORAL at 05:11

## 2022-05-02 RX ADMIN — INSULIN ASPART 6 UNITS: 100 INJECTION, SOLUTION INTRAVENOUS; SUBCUTANEOUS at 12:26

## 2022-05-02 RX ADMIN — BENZONATATE 100 MG: 100 CAPSULE ORAL at 12:23

## 2022-05-02 RX ADMIN — PREDNISONE 20 MG: 20 TABLET ORAL at 08:28

## 2022-05-02 RX ADMIN — PANTOPRAZOLE SODIUM 40 MG: 40 TABLET, DELAYED RELEASE ORAL at 08:27

## 2022-05-02 RX ADMIN — INSULIN ASPART 6 UNITS: 100 INJECTION, SOLUTION INTRAVENOUS; SUBCUTANEOUS at 07:57

## 2022-05-02 RX ADMIN — HEPARIN SODIUM 5000 UNITS: 5000 INJECTION, SOLUTION INTRAVENOUS; SUBCUTANEOUS at 12:24

## 2022-05-02 RX ADMIN — IPRATROPIUM BROMIDE AND ALBUTEROL SULFATE 3 ML: 2.5; .5 SOLUTION RESPIRATORY (INHALATION) at 08:59

## 2022-05-02 RX ADMIN — HEPARIN SODIUM 5000 UNITS: 5000 INJECTION, SOLUTION INTRAVENOUS; SUBCUTANEOUS at 05:00

## 2022-05-02 RX ADMIN — INSULIN GLARGINE 4 UNITS: 100 INJECTION, SOLUTION SUBCUTANEOUS at 08:00

## 2022-05-02 RX ADMIN — INSULIN ASPART 6 UNITS: 100 INJECTION, SOLUTION INTRAVENOUS; SUBCUTANEOUS at 07:56

## 2022-05-02 RX ADMIN — BUDESONIDE 0.5 MG: 0.5 INHALANT ORAL at 09:00

## 2022-05-02 RX ADMIN — AMLODIPINE BESYLATE 5 MG: 5 TABLET ORAL at 08:27

## 2022-05-02 RX ADMIN — INSULIN ASPART 2 UNITS: 100 INJECTION, SOLUTION INTRAVENOUS; SUBCUTANEOUS at 12:34

## 2022-05-02 ASSESSMENT — COGNITIVE AND FUNCTIONAL STATUS - GENERAL
CLIMB 3 TO 5 STEPS WITH RAILING: 2 - A LOT
WALKING IN HOSPITAL ROOM: 3 - A LITTLE
MOVING TO AND FROM BED TO CHAIR: 3 - A LITTLE
AFFECT: WFL
STANDING UP FROM CHAIR USING ARMS: 3 - A LITTLE

## 2022-05-02 NOTE — HOSPITAL COURSE
Darron is a 89 y.o. male admitted on 4/26/2022 with Cough [R05.9]  Hyperkalemia [E87.5]  Hyponatremia [E87.1]  Hyperglycemia [R73.9]  Hypoxia [R09.02]  Elevated troponin [R77.8]. Principal problem is Hyponatremia.    Past Medical History  Darron has a past medical history of Diabetes mellitus type I (CMS/[facility]).    History of Present Illness   Per H&P: 89 y.o. male with a past medical history of COPD, HTN, HLD, T1DM, CKD, LBBB that presents for cough.  Patient is a poor historian.  Patient obtained by ED staff.  He reports productive cough with brown sputum x 1.5 weeks.  He reports stuffy nose, which was improved by Mucienex.  He reports increased dyspnea and wheezing.  He reports fatigue.  He reports sick contact with family.  He denies fever, chills, lightheadedness, dizziness, chest pain, palpitations, abdominal pain, vomiting, diarrhea, change in PO intake, dysuria, LE edema.

## 2022-05-02 NOTE — PROGRESS NOTES
Endocrinology Progress Note    Darron Raman   [id number]   12/1/1928   Admission date: 4/26/2022    Interval history:   Eating ok  20 mg on prednisone today . On taper of prednisone   Appetite great   [place] of hearing   BG in 220s now on lonny, just ate    Blood Glucose results   Results from last 7 days   Lab Units 05/02/22  1155 05/02/22  0633 05/01/22  2221 05/01/22  1617 05/01/22  1240 05/01/22  0757 05/01/22  0329   POCT GLUCOSE mg/dL 215* 305* 297* 221* 101* 286* 248*          Lab Results   Component Value Date    GLUCOSE 367 (H) 05/02/2022    CALCIUM 8.4 (L) 05/02/2022     (L) 05/02/2022    K 4.8 05/02/2022    CO2 23 05/02/2022    CL 98 05/02/2022    BUN 34 (H) 05/02/2022    CREATININE 1.6 (H) 05/02/2022   No results found for: HGBA1C  Current Meds  •  acetaminophen, 650 mg, oral, q4h PRN  •  amLODIPine, 5 mg, oral, Daily  •  atropine, 0.5 mg, intravenous, q5 min PRN  •  azithromycin, 500 mg, intravenous, q24h INT  •  benzonatate, 100 mg, oral, q6h not 12m  •  budesonide, 0.5 mg, nebulization, BID (6a, 6p)  •  glucose, 16-32 g of dextrose, oral, PRN **OR** dextrose, 15-30 g of dextrose, oral, PRN **OR** glucagon, 1 mg, intramuscular, PRN **OR** dextrose in water, 25 mL, intravenous, PRN  •  guaiFENesin, 200 mg, oral, q6h PRN  •  heparin (porcine), 5,000 Units, subcutaneous, q8h INT  •  hydrALAZINE, 10 mg, intravenous, q8h PRN  •  insulin aspart U-100, 0-10 Units, subcutaneous, TID with meals  •  insulin aspart U-100, 0-4 Units, subcutaneous, Nightly  •  insulin aspart U-100, 0-4 Units, subcutaneous, q24h  •  insulin aspart U-100, 6 Units, subcutaneous, TID with meals  •  insulin glargine U-100, 4 Units, subcutaneous, Nightly  •  insulin glargine U-100, 4 Units, subcutaneous, q AM  •  ipratropium-albuteroL, 3 mL, nebulization, q6h NETTIE  •  nitroglycerin, 0.4 mg, sublingual, q5 min PRN  •  pantoprazole, 40 mg, oral, Daily  •  [COMPLETED] predniSONE, 40 mg, oral, Daily **FOLLOWED BY** [COMPLETED]  predniSONE, 30 mg, oral, Daily **FOLLOWED BY** [COMPLETED] predniSONE, 20 mg, oral, Daily **FOLLOWED BY** [START ON 5/3/2022] predniSONE, 10 mg, oral, Daily        Physical Exam  Temp:  [36.3 °C (97.3 °F)-36.7 °C (98.1 °F)] 36.7 °C (98.1 °F)  Heart Rate:  [76-86] 77  Resp:  [16-18] 16  BP: (128-182)/(65-90) 167/82    General appearance: alert and no distress  Head: normocephalic, without obvious abnormality  Lungs: scattered expiratory wheeze  Heart: regular rate and rhythm  Abdomen: soft, non-tender; bowel sounds normal  Extremities: no edema        LABS reviewed   I have reviewed the patient's labs.    Results from last 7 days   Lab Units 05/02/22  0421 05/01/22  0534 04/30/22  0509 04/27/22  0219 04/26/22  2130   SODIUM mEQ/L 131* 133* 135*   < > 126*   POTASSIUM mEQ/L 4.8 4.7 4.0   < > 5.6*   CHLORIDE mEQ/L 98 101 105   < > 94*   CO2 mEQ/L 23 24 24   < > 23   BUN mg/dL 34* 33* 40*   < > 30*   CREATININE mg/dL 1.6* 1.4* 1.3   < > 1.3   CALCIUM mg/dL 8.4* 8.4* 8.5*   < > 8.4*   ALBUMIN g/dL  --   --   --   --  3.4   BILIRUBIN TOTAL mg/dL  --   --   --   --  1.6*   ALK PHOS IU/L  --   --   --   --  96   ALT IU/L  --   --   --   --  14*   AST IU/L  --   --   --   --  30   GLUCOSE mg/dL 367* 304* 160*   < > 314*    < > = values in this interval not displayed.                 No results found for: HGBA1C  Lab Results   Component Value Date    CREATININE 1.6 (H) 05/02/2022     Estimated Creatinine Clearance: 30.5 mL/min (A) (by C-G formula based on SCr of 1.6 mg/dL (H)).      Assessment/Plan:   Diabetes mellitus: type 1 with steroid induced hyperglycemia. He is admitted with acute bronchitis and was given solumedrol 125 mg on admission.   Home dose is 4 units Lantus twice daily and 4 units Novolog with meals.   His A1c was recently 9.5% per pt's daughter; had been under 7% prior to that. He has had DM for 50 years and has been able to manage well until recently. Sees Dr Garnett as outpt. He has lonny.     He had been  on insulin drip twice due to steroid induced hyperglycemia.     Now on  Prednisone 20 mg daily in AM  with taper as outpatient.  Now on home dose of  Lantus 4 units twice daily in AM and hs; continue the same    Continue  Novolog 6 units with meals while on Prednisone.    novolog scale > 200 wc 2units/50 mg/dl  and at hs > 250   Given age not want strict control. Can check 3 am BG but no coverage at that time with insulin  Will need continued adjustment as outpatient.      Coral Christianson MD

## 2022-05-02 NOTE — PLAN OF CARE
Problem: Adult Inpatient Plan of Care  Goal: Plan of Care Review  Flowsheets (Taken 5/2/2022 1552)  Progress: improving  Plan of Care Reviewed With: other (see comments)     1141 Pt for possible d/ch to return home to pt's residence @ [address] [place]. Pt cleared by therapy for d/ch home. SW spoke w/ pt's son, Sergo, regarding dcp. Sergo states they are unsure if they want to initiate home care services at this time and will f/u w/ pt's PCP accordingly. Pt for d/ch home w/ 24/7 care from family and/or private aides, which pt's family is also coordinating at this time

## 2022-05-02 NOTE — NURSING NOTE
Patient and daughter verbalized understanding of discharge instructions with no questions/concerns. No changes since previous assessment. See PT and SW notes. Pt remains oriented, calm and cooperative throughout shift, safety 1:1 discontinued at 1400 per verbal order Dr. Snowden. Patient's family gathered belongings, awaiting dc wheelchair.

## 2022-05-02 NOTE — DISCHARGE SUMMARY
Hospital Medicine Service -  Inpatient Discharge Summary        BRIEF OVERVIEW   Admitting Provider: Eloina Marshall MD  Attending Provider: No att. providers found Attending phys phone: N/A    PCP: Eugenia Ellison [phone]    Admission Date: 4/26/2022  Discharge Date: 5/2/2022     DISCHARGE DIAGNOSES      Primary Discharge Diagnosis  Hyponatremia    Secondary Discharge Diagnoses  Active Hospital Problems    Diagnosis Date Noted   • Prolonged Q-T interval on ECG 04/29/2022   • Acute bronchitis 04/28/2022   • Hyponatremia 04/27/2022   • Stage 3 chronic kidney disease (CMS/HCC) 04/27/2022   • Delirium 04/27/2022   • ACP (advance care planning) 04/27/2022   • Diabetes mellitus type I (CMS/[facility])    • HLD (hyperlipidemia)    • LBBB (left bundle branch block)    • COPD (chronic obstructive pulmonary disease) (CMS/[facility])    • Pulmonary nodule       Resolved Hospital Problems   No resolved problems to display.       Problem List on Day of Discharge  Prolonged Q-T interval on ECG  Assessment & Plan  - EKG (4/26/22): QTc 499   - Recheck EKG     Acute bronchitis  Assessment & Plan  - Acute bronchitis + asthma exacerbation   - Pulmonology consulted, recommendations appreciated   - Steroids were held due to uncontrolled blood glucose but then resumed due to respiratory status  - Rapid steroid taper. Prednisone 40 x 1 day, 30 x 1, 20 x 1, 10 x 1 -> has 1 more day of 10mg prednisone tomorrow then done with steroid  - Discussed with pulmonology   - Endocrinology consulted, recommendations appreciated.          Delirium  Assessment & Plan  - UA negative for infection   - Exacerbated by steroids.   - Plan on quick taper   - Avoid QT prolonging medications     Pulmonary nodule  Assessment & Plan  - CXR with right-sided pulmonary nodule, no prior imaging for comparison  - Labs significant for hyponatremia  - CT chest (4/27/22): Few small pulmonary nodules measuring up to 4 mm.  Diffuse peribronchial thickening.    Cardiomegaly.  Small pericardial effusion. Findings suggestive of medullary nephrocalcinosis.   - Per pulmonology has minimal and insignificant pulmonary nodules, particularly at his advanced age.        COPD (chronic obstructive pulmonary disease) (CMS/[facility])  Assessment & Plan  - See acute bronchitis         HLD (hyperlipidemia)  Assessment & Plan  - Not on home regimen    Diabetes mellitus type I (CMS/[facility])  Assessment & Plan     - Off insulin drip now on subcutaneous insulin   - Endocrine following  - Will continue to monitor closely     Discussed with endo regarding d/c insulin dose. Recommend to continue current regiment at home.       Stage 3 chronic kidney disease (CMS/[facility])  Assessment & Plan  - Renal function stable at baseline    * Hyponatremia  Assessment & Plan  - Mostly due to hyperglycemia   - Will continue to monitor     SUMMARY OF HOSPITALIZATION      Presenting Problem/History of Present Illness  This is a 89 y.o. year-old male admitted on 4/26/2022 with Cough [R05.9]  Hyperkalemia [E87.5]  Hyponatremia [E87.1]  Hyperglycemia [R73.9]  Hypoxia [R09.02]  Elevated troponin [R77.8].      Hospital Course    89-year-old male with past medical history of COPD, hypertension, hyperlipidemia, diabetes mellitus type 1, CKD, left bundle branch block who presented with cough and shortness of breath.  Patient was admitted for COPD exacerbation and was started on IV Solu-Medrol and azithromycin in the ER.  Overall patient respiratory status improved however patient blood sugar was wildly uncontrolled while on steroids.  Patient was seen by endocrinology for his blood glucose level.  Due to her elevated blood sugar level he also had a quick steroid taper.  He was seen by pulmonology while in the hospital for his COPD exacerbation.  Prior to discharge patient was off of oxygen without any wheezing and feeling well.  His blood glucose ranged from 100-200 while in the hospital.  Discussed discharge instruction with  patient, daughter and endocrinology.  Patient will be discharged home today.  Family report patient has enough insulin at home for additional sliding scale if needed.    Exam on Day of Discharge  Physical Exam  Physical Exam   Constitutional:  appears well-developed and well-nourished. No distress.   HENT:   Head: Normocephalic and atraumatic.   Right Ear: External ear normal.   Left Ear: External ear normal.   Mouth/Throat: Oropharynx is clear and moist.   Eyes: Conjunctivae and EOM are normal. Right eye exhibits no discharge. Left eye exhibits no discharge.   Neck: Normal range of motion. Neck supple. No tracheal deviation present.   Cardiovascular: Normal rate, regular rhythm and normal heart sounds.  Exam reveals no gallop and no friction rub.    No murmur heard.  Pulmonary/Chest: diminished BS. No stridor. No respiratory distress.  has no wheezes.  has no rales.   Abdominal: Soft. Bowel sounds are normal. exhibits no distension. There is no tenderness. There is no rebound and no guarding.   Musculoskeletal:  exhibits no edema or deformity.   Neurological:  is alert, awake, oriented x3   Skin: Skin is warm and dry, not diaphoretic.     Consults During Admission  IP CONSULT TO PULMONOLOGY/SLEEP MEDICINE  IP CONSULT TO ENDOCRINOLOGY    DISCHARGE MEDICATIONS               Medication List      START taking these medications    albuterol HFA 90 mcg/actuation inhaler  Commonly known as: VENTOLIN HFA  Inhale 2 puffs every 4 (four) hours as needed for wheezing or shortness of breath.  Dose: 2 puff     amLODIPine 5 mg tablet  Commonly known as: NORVASC  Start taking on: May 3, 2022  Take 1 tablet (5 mg total) by mouth daily.  Dose: 5 mg     budesonide-formoteroL 80-4.5 mcg/actuation inhaler  Commonly known as: SYMBICORT  Inhale 2 puffs 2 (two) times a day.  Dose: 2 puff     predniSONE 10 mg tablet  Commonly known as: DELTASONE  Start taking on: May 3, 2022  Take 1 tablet (10 mg total) by mouth daily for 1 dose.  Dose: 10  mg        CHANGE how you take these medications    * insulin aspart U-100 100 unit/mL injection  Commonly known as: NovoLOG  Inject 6 Units under the skin 3 (three) times a day before meals. Also sliding scale before meals and at bedtime  Dose: 6 Units  What changed: how much to take     * insulin aspart U-100 100 unit/mL (3 mL) pen  Commonly known as: NovoLOG  Inject 0-10 Units under the skin 3 (three) times a day with meals for 266 doses.  Dose: 0-10 Units  What changed: You were already taking a medication with the same name, and this prescription was added. Make sure you understand how and when to take each.         * This list has 2 medication(s) that are the same as other medications prescribed for you. Read the directions carefully, and ask your doctor or other care provider to review them with you.            CONTINUE taking these medications    acetaminophen 325 mg tablet  Commonly known as: TYLENOL  Take 650 mg by mouth every 4 (four) hours as needed for mild pain or fever (specify temp in comments): (>100F).  Dose: 650 mg     ascorbic acid 250 mg tablet  Commonly known as: VITAMIN C  Take 250 mg by mouth daily.  Dose: 250 mg     docusate sodium 100 mg capsule  Commonly known as: COLACE  Take 100 mg by mouth 2 (two) times a day.  Dose: 100 mg     guaiFENesin 600 mg 12 hr tablet  Commonly known as: MUCINEX  Take 1 tablet (600 mg total) by mouth 2 (two) times a day for 3 days.  Dose: 600 mg     insulin glargine U-100 100 unit/mL (3 mL) pen  Commonly known as: LANTUS SOLOSTAR/BASAGLAR  Inject 4 Units under the skin 2 (two) times a day.  Dose: 4 Units             Instructions for after discharge     Call provider for:  difficulty breathing, headache or visual disturbances      Call provider for:  extreme fatigue      Call provider for:  persistent dizziness or light-headedness      Call provider for:  persistent nausea or vomiting      Call provider for:  redness, tenderness, or signs of infection (pain,  swelling, redness, odor or green/yellow discharge around incision site)      Call provider for:  severe uncontrolled pain      Call provider for:  temperature >100.4      Call provider for: blood sugar change      Call provider for blood sugar less than 70 or greater than 400    Discharge diet      Diet Type / Texture:  Diabetic (Low Carb/Low Fat)  Diabetic, No Concentrated Sweets       Follow-up with primary physician (PCP)      Follow-up with provider      Follow up in 2-3 weeks    Klaudia Duarte MD   [phone]    [address], [place] [address]  [place] [id number]       Post-Discharge Activity: Normal activity as tolerated.      Normal activity as tolerated.             PROCEDURES / LABS / IMAGING        Pertinent Labs  CBC Results       05/02/22 05/01/22 04/30/22     0421 0534 0509    WBC 7.42 5.97 6.98    RBC 4.09 3.85 3.60    HGB 12.6 11.8 11.2    HCT 38.1 36.2 33.4    MCV 93.2 94.0 92.8    MCH 30.8 30.6 31.1    MCHC 33.1 32.6 33.5     259 255        CMP Results       05/02/22 05/01/22 04/30/22     0421 0534 0509     133 135    K 4.8 4.7 4.0    Cl 98 101 105    CO2 23 24 24    Glucose 367 304 160    BUN 34 33 40    Creatinine 1.6 1.4 1.3    Calcium 8.4 8.4 8.5    Anion Gap 10 8 6    EGFR 40.5 47.3 51.5            SARS-CoV-2 (COVID-19) (no units)   Date/Time Value   04/26/2022 2135 Negative       Pertinent Imaging  X-RAY CHEST 2 VIEWS    Result Date: 4/27/2022  IMPRESSION:  No evidence of active cardiopulmonary disease. Large lung volumes concerning for COPD. COMPARISON: None  available. COMMENT:  PA and lateral views of the chest are completed. Large lung volumes noted with some flattening hemidiaphragms, prominent retrosternal airspace, concerning for COPD. There is no consolidation or pleural fluid. No pleural air. Right nipple shadow noted. Normal pulmonary vascularity and cardiac size noted. Mild atheromatous disease in the thoracic aorta. Unremarkable hilar and mediastinal contours.  The imaged upper abdomen is unremarkable. There are diffuse spondylitic changes through the mid lower dorsal spine. Reverse right total shoulder arthroplasty noted, with long intramedullary benji.     CT CHEST WITHOUT IV CONTRAST    Result Date: 4/27/2022  IMPRESSION: 1.  Few small pulmonary nodules measuring up to 4 mm. 2.  Diffuse peribronchial thickening. 3.  Cardiomegaly.  Small pericardial effusion. 4.  Findings suggestive of medullary nephrocalcinosis. -------------------------------------------------------------------------------- ---------------- Fleischner Society 2017 Guidelines for Management of Incidentally Detected Pulmonary Nodules in Adults. (Solid Nodules) Nodule size < 6 mm (multiple) Low-Risk Patient - No routine follow-up High-Risk Patient - Optional CT at 12 months -------------------------------------------------------------------------------- ----------------      OUTPATIENT  FOLLOW-UP / REFERRALS / PENDING TESTS        Outpatient Follow-Up Appointments  Encounter Information    This patient does not currently have any appointments scheduled.         Referrals  No orders of the defined types were placed in this encounter.      Test Results Pending at Discharge  Unresulted Labs (From admission, onward)            None          Important Issues to Address in Follow-Up  Please continue to use inhaler as prescribed and follow up with pulmonology in 2-3 weeks.   Continue insulin. You should use Lantus 4 units twice a day. Novolog 6 units 3 times daily with meal and sliding scale insulin with meals and at night. Sliding scale as below:     For meal time:   Blood Glucose --->coverage    200-250 ---> 2 units;    251-300 ---> 4 units;    301-350 ---> 6 units;    351-400 ---> 8 units;    Above 400 ---> 10 units     For night time before bed:   Blood Glucose mg/dl --->coverage   250-300 ---> 1 units;   301-350 ---> 2units;   351-400 ---> 3 units;   above 400 --->4 units      DISCHARGE DISPOSITION AND  DESTINATION      Disposition: Home   Destination:                              Code Status At Discharge: DNR (A.N.D.)      Time spent: Total time spent on discharge was 40 minutes coordinating care with specialists, nursing, /case management.  Also discussing discharge planning with patient, daughter.    Physician Order for Life-Sustaining Treatment Document Status      No documents found

## 2022-05-02 NOTE — PROGRESS NOTES
Patient: Darron Raman  Location:  [facility] 4B 4203  MRN:  [id number]  Today's date:  5/2/2022     Patient left in w/c in NAD. 1:1 present & visitor present. RN aware of patient's status.    Darron is a 89 y.o. male admitted on 4/26/2022 with Cough [R05.9]  Hyperkalemia [E87.5]  Hyponatremia [E87.1]  Hyperglycemia [R73.9]  Hypoxia [R09.02]  Elevated troponin [R77.8]. Principal problem is Hyponatremia.    Past Medical History  Darron has a past medical history of Diabetes mellitus type I (CMS/HCC).    History of Present Illness   Per H&P: 89 y.o. male with a past medical history of COPD, HTN, HLD, T1DM, CKD, LBBB that presents for cough.  Patient is a poor historian.  Patient obtained by ED staff.  He reports productive cough with brown sputum x 1.5 weeks.  He reports stuffy nose, which was improved by Mucienex.  He reports increased dyspnea and wheezing.  He reports fatigue.  He reports sick contact with family.  He denies fever, chills, lightheadedness, dizziness, chest pain, palpitations, abdominal pain, vomiting, diarrhea, change in PO intake, dysuria, LE edema.       PT Vitals    Date/Time Pulse SpO2 Pt Activity O2 Therapy BP BP Method Pt Position Observations [facility]   05/02/22 1228 80 95 % At rest None (Room air) 173/90 RN notified Automatic Sitting PT [facility]   05/02/22 1240 77 99 % At rest None (Room air) 167/82 Automatic Sitting PT- post mobility [facility]      PT Pain    Date/Time Pain Type Rating: Rest Rating: Activity [facility]   05/02/22 1228 Pain Assessment 0 - no pain 0 - no pain [facility]   05/02/22 1240 Pain Assessment 0 - no pain -- E[facility]          Prior Living Environment    Flowsheet Row Most Recent Value   People in Home alone  [plans to have family friend stay w/ him at d/c.]   Current Living Arrangements home   Living Environment Comment Son provided home set up, reports 1SH w/ no stairs to navigate, in process of installing walk in shower        Prior Level of Function    Flowsheet Row Most Recent Value    Ambulation assistive equipment   Transferring independent   Toileting independent   Bathing independent   Dressing independent   Prior Level of Function Comment Son reports pt independent PTA, using RW for longer distances   Assistive Device Currently Used at Home walker, front-wheeled, wheelchair           PT Evaluation and Treatment - 05/02/22 1224        PT Time Calculation    Start Time 1224     Stop Time 1246     Time Calculation (min) 22 min        Session Details    Document Type initial evaluation     Mode of Treatment physical therapy        General Information    Patient Profile Reviewed yes     Patient/Family/Caregiver Comments/Observations 1:1 present during session.Son present initially, family friend present t/o session.     General Observations of Patient RN cleared dpt for session. Pt received in w/c, agreeable to therapy.     Existing Precautions/Restrictions fall;other (see comments)   1:1, activity as tolerated    Limitations/Impairments safety/cognitive        Cognition/Psychosocial    Affect/Mental Status (Cognition) WFL     Orientation Status (Cognition) oriented x 3     Follows Commands (Cognition) follows one-step commands;over 90% accuracy     Comment, Cognition pleasant/cooperative/receptive. Req'd cues for safety at times.        Sensory Assessment (Somatosensory)    Sensory Assessment (Somatosensory) LE sensation intact   grossly to light touch       Range of Motion (ROM)    Range of Motion ROM is WFL;bilateral lower extremities        Strength Comprehensive (MMT)    Comment Assessed functionally BLEs at least 3/5        Bed Mobility    Comment (Bed Mobility) NT as patient OOB t/o session        Sit to Stand Transfer    [place], Sit to Stand Transfer close supervision     Assistive Device walker, front-wheeled     Comment from w/c        Stand to Sit Transfer    [place], Stand to Sit Transfer close supervision     Assistive Device walker, front-wheeled     Comment to w/c         Gait Training    [place], Gait close supervision;minimum assist (75% or more patient effort);1 person assist     Assistive Device walker, front-wheeled     Distance in Feet 60 feet     Pattern (Gait) step-through     Deviations/Abnormal Patterns (Gait) base of support, narrow;stride length decreased;step length decreased;gait speed decreased     Comment (Gait/Stairs) close supervision but req'd [place] while turning w/ 1 minor LOB        Stairs Training    Comment No stairs at home per pt's son        Safety Issues, Functional Mobility    Safety Issues Affecting Function (Mobility) insight into deficits/self-awareness     Impairments Affecting Function (Mobility) balance;endurance/activity tolerance;strength        Balance    Balance Assessment sitting static balance;sit to stand dynamic balance;standing static balance;standing dynamic balance     Static Sitting Balance WFL;sitting in chair     Sit to Stand Dynamic Balance mild impairment     Static Standing Balance WFL;supported     Dynamic Standing Balance mild impairment;supported     Comment, Balance RW        AM-PAC (TM) - Mobility (Current Function)    Turning from your back to your side while in a flat bed without using bedrails? 3 - A Little     Moving from lying on your back to sitting on the side of a flat bed without using bedrails? 3 - A Little     Moving to and from a bed to a chair? 3 - A Little     Standing up from a chair using your arms? 3 - A Little     To walk in a hospital room? 3 - A Little     Climbing 3-5 steps with a railing? 2 - A Lot     AM-PAC (TM) Mobility Score 17        Assessment/Plan (PT)    Daily Outcome Statement [facility] 17. Patient at close supervision level for STS & close supervision<>Estevan for amb w/ RW d/t balance, strength & endurance deficits. PT provided education for pacing mobility. He req cont'd skilled PT to address noted deficits in order to max safety & independence for all fxnl activities & dec fall risk. Rec home  w/ assist for all mobility + home PT vs. SNF.     Rehab Potential good, to achieve stated therapy goals     Therapy Frequency 3-5 times/wk     Planned Therapy Interventions balance training;bed mobility training;gait training;home exercise program;patient/family education;ROM (range of motion);strengthening;transfer training               PT Assessment/Plan    Flowsheet Row Most Recent Value   PT Recommended Discharge Disposition home with assistance, home with home health  [assist for all mobility vs. SNF] at 05/02/2022 1224   Anticipated Equipment Needs at Discharge (PT) none  [owns RW & wheelchair] at 05/02/2022 1224   Patient/Family Therapy Goals Statement home at 05/02/2022 1224                    Education Documentation  Joint Mobility/Strength, taught by Vanessa Polanco PT at 5/2/2022  1:09 PM.  Learner: Other, Patient  Readiness: Acceptance  Method: Explanation  Response: Verbalizes Understanding  Comment: Role of PT, PT POC, safety/pacing mobility          PT Goals    Flowsheet Row Most Recent Value   Bed Mobility Goal 1    Activity/Assistive Device bed mobility activities, all at 05/02/2022 1224   [place] independent at 05/02/2022 1224   Time Frame by discharge at 05/02/2022 1224   Progress/Outcome goal ongoing at 05/02/2022 1224   Transfer Goal 1    Activity/Assistive Device all transfers, walker, front-wheeled at 05/02/2022 1224   [place] modified independence at 05/02/2022 1224   Time Frame by discharge at 05/02/2022 1224   Progress/Outcome goal ongoing at 05/02/2022 1224   Gait Training Goal 1    Activity/Assistive Device gait (walking locomotion), walker, front-wheeled at 05/02/2022 1224   [place] modified independence at 05/02/2022 1224   Distance 150 at 05/02/2022 1224   Time Frame by discharge at 05/02/2022 1224   Progress/Outcome goal ongoing at 05/02/2022 1224

## 2022-05-02 NOTE — PLAN OF CARE
Problem: Adult Inpatient Plan of Care  Goal: Plan of Care Review  Outcome: Progressing  Flowsheets (Taken 5/2/2022 5658)  Plan of Care Reviewed With: patient  Outcome Summary: VSS. Denies pain. calm and cooperative. 1:1 for safety. occasional cough. lungs decrease. voiding without difficulty.  Goal: Patient-Specific Goal (Individualized)  Outcome: Progressing  Goal: Absence of Hospital-Acquired Illness or Injury  Outcome: Progressing  Goal: Optimal Comfort and Wellbeing  Outcome: Progressing  Goal: Readiness for Transition of Care  Outcome: Progressing     Problem: Skin Injury Risk Increased  Goal: Skin Health and Integrity  Outcome: Progressing

## 2022-05-02 NOTE — PLAN OF CARE
Problem: Adult Inpatient Plan of Care  Goal: Plan of Care Review  Outcome: Progressing  Flowsheets (Taken 5/2/2022 8477)  Progress: improving  Plan of Care Reviewed With: patient  Outcome Summary: close supervision/[place] for mobility w/ RW

## 2022-05-02 NOTE — CONSULTS
Chart rev'd and pt seen for RD screen, LOS x 7 days. Admitted with bronchitis, pulmonary nodule, currently on steroid taper. Hx T2DM, accucks 215, 305mg/dL today. SSI, Lantus ordered. Hope for improved levels when steroid use ends, endocrine following. Good appetite reported, intake ~100% eggs, smiley, fruit cup for breakfast, no n/v. Has 1:1 present, provided diabetic menu with insert outlining #g CHO per meal allowance. Pt is in good spitits, OOB in chair. No reported nutiriton concerns. BMI 23, normal. Will s/o. Consult prn.     ROXANNE Roberts

## 2022-05-02 NOTE — PATIENT CARE CONFERENCE
Care Progression Rounds Note  Date: 5/2/2022  Time: 9:06 AM     Patient Name: Darron Raman     Medical Record Number: [id number]   YOB: 1928  Sex: Male      Room/Bed: 4203    Admitting Diagnosis: Cough [R05.9]  Hyperkalemia [E87.5]  Hyponatremia [E87.1]  Hyperglycemia [R73.9]  Hypoxia [R09.02]  Elevated troponin [R77.8]   Admit Date/Time: 4/26/2022  9:53 PM    Primary Diagnosis: Hyponatremia  Principal Problem: Hyponatremia    GMLOS: 2.9  Anticipated Discharge Date: 5/3/2022    AM-PAC:  Mobility Score:      Discharge Planning:  Concerns to be Addressed: care coordination/care conferences, discharge planning  Anticipated Discharge Disposition: home with home health, assisted living facility, skilled nursing facility  Type of Home Care Services: home OT, home PT, nursing    Barriers to Discharge:  Medical issues not resolved    Comments:       Participants:  social work/services, nursing

## 2022-05-02 NOTE — DISCHARGE INSTRUCTIONS
Please continue to use inhaler as prescribed and follow up with pulmonology in 2-3 weeks.   Continue insulin. You should use Lantus 4 units twice a day. Novolog 6 units 3 times daily with meal and sliding scale insulin with meals and at night. Sliding scale as below:     For meal time:   Blood Glucose --->coverage    200-250 ---> 2 units;    251-300 ---> 4 units;    301-350 ---> 6 units;    351-400 ---> 8 units;    Above 400 ---> 10 units     For night time before bed:   Blood Glucose mg/dl --->coverage   250-300 ---> 1 units;   301-350 ---> 2units;   351-400 ---> 3 units;   above 400 --->4 units

## 2023-06-02 NOTE — PRE-PROCEDURE INSTRUCTIONS
Pre-Surgery Instructions:   Medication Instructions    ascorbic acid (VITAMIN C) 500 mg tablet Instructed patient per Anesthesia Guidelines   Insulin Aspart (NOVOLOG SC) Instructed patient per Anesthesia Guidelines   Insulin Glargine (LANTUS SC) Instructed patient per Anesthesia Guidelines  Instructed to take lantus insulin am of surgery per anesthesia guidelines 
Normal rate, regular rhythm.  Heart sounds S1, S2.  No murmurs, rubs or gallops.

## (undated) DEVICE — 3.5MM CORTEX SCREW SELF-TAPPING 30MM: Type: IMPLANTABLE DEVICE | Site: HUMERUS | Status: NON-FUNCTIONAL

## (undated) DEVICE — SCD SEQUENTIAL COMPRESSION COMFORT SLEEVE MEDIUM KNEE LENGTH: Brand: KENDALL SCD

## (undated) DEVICE — OCCLUSIVE GAUZE STRIP,3% BISMUTH TRIBROMOPHENATE IN PETROLATUM BLEND: Brand: XEROFORM

## (undated) DEVICE — SKIN MARKER DUAL TIP WITH RULER CAP, FLEXIBLE RULER AND LABELS: Brand: DEVON

## (undated) DEVICE — ADHESIVE SKIN CLOSR DERMABOND PRINEO

## (undated) DEVICE — MASTISOL LIQ ADHESIVE 2/3ML

## (undated) DEVICE — CAPIT RVRS TM TOTAL SHOULDER

## (undated) DEVICE — GAUZE SPONGES,USP TYPE VII GAUZE, 12 PLY: Brand: CURITY

## (undated) DEVICE — GLOVE INDICATOR PI UNDERGLOVE SZ 8 BLUE

## (undated) DEVICE — 3M™ TEGADERM™ TRANSPARENT FILM DRESSING FRAME STYLE, 1627, 4 IN X 10 IN (10 CM X 25 CM), 20/CT 4CT/CASE: Brand: 3M™ TEGADERM™

## (undated) DEVICE — 3000CC GUARDIAN II: Brand: GUARDIAN

## (undated) DEVICE — PREP SURGICAL PURPREP 26ML

## (undated) DEVICE — IMPERVIOUS STOCKINETTE: Brand: DEROYAL

## (undated) DEVICE — 3.5MM CORTEX SCREW SELF-TAPPING 34MM: Type: IMPLANTABLE DEVICE | Site: HUMERUS | Status: NON-FUNCTIONAL

## (undated) DEVICE — BLADE SAGITTAL 63.0 X 19.5MM

## (undated) DEVICE — PLUMEPEN PRO 10FT

## (undated) DEVICE — SUT MONOCRYL 3-0 PS-2 27 IN Y427H

## (undated) DEVICE — SUT FIBERWIRE #2 1/2 CIRCLE T-5 38IN AR-7200

## (undated) DEVICE — GLOVE PI ULTRA TOUCH SZ.7.5

## (undated) DEVICE — DRAPE C-ARM X-RAY

## (undated) DEVICE — 3M™ STERI-STRIP™ REINFORCED ADHESIVE SKIN CLOSURES, R1547, 1/2 IN X 4 IN (12 MM X 100 MM), 6 STRIPS/ENVELOPE: Brand: 3M™ STERI-STRIP™

## (undated) DEVICE — COBAN 6 IN STERILE

## (undated) DEVICE — 3.5MM LOCKING SCREW SLF-TPNG W/STARDRIVE(TM) RECESS 32MM: Type: IMPLANTABLE DEVICE | Site: HUMERUS | Status: NON-FUNCTIONAL

## (undated) DEVICE — 2.5MM DRILL BIT/QC/GOLD/110MM

## (undated) DEVICE — THE SIMPULSE SOLO SYSTEM WITH ULTREX RETRACTABLE SPLASH SHIELD TIP: Brand: SIMPULSE SOLO

## (undated) DEVICE — 2.8MM DRILL BIT/QC/165MM

## (undated) DEVICE — SUT ETHIBOND 5 V-37 30 IN MB66G

## (undated) DEVICE — SUT VICRYL 2-0 CT-2 27 IN J269H

## (undated) DEVICE — GAUZE SPONGES,16 PLY: Brand: CURITY

## (undated) DEVICE — CAST PADDING 4 IN SYNTHETIC NON-STRL

## (undated) DEVICE — SUT STRATAFIX SPIRAL MONOCRYL PLUS 2-0 CT-1 30CM SXMP1B412

## (undated) DEVICE — 3.5MM CORTEX SCREW SELF-TAPPING 32MM: Type: IMPLANTABLE DEVICE | Site: HUMERUS | Status: NON-FUNCTIONAL

## (undated) DEVICE — SUT VICRYL 2-0 CT-1 36 IN J945H

## (undated) DEVICE — INTENDED FOR TISSUE SEPARATION, AND OTHER PROCEDURES THAT REQUIRE A SHARP SURGICAL BLADE TO PUNCTURE OR CUT.: Brand: BARD-PARKER ® CARBON RIB-BACK BLADES

## (undated) DEVICE — VIOLET BRAIDED (POLYGLACTIN 910), SYNTHETIC ABSORBABLE SUTURE: Brand: COATED VICRYL

## (undated) DEVICE — MEDI-VAC YANKAUER SUCTION HANDLE W/BULBOUS AND CONTROL VENT: Brand: CARDINAL HEALTH

## (undated) DEVICE — GLOVE INDICATOR PI UNDERGLOVE SZ 7 BLUE

## (undated) DEVICE — ABDOMINAL PAD: Brand: DERMACEA

## (undated) DEVICE — GLOVE SRG BIOGEL 8

## (undated) DEVICE — SUT VICRYL 1 CT-1 27 IN J261H

## (undated) DEVICE — BETHLEHEM TOTAL HIP, KIT: Brand: CARDINAL HEALTH

## (undated) DEVICE — 3.5MM LOCKING SCREW SLF-TPNG W/STARDRIVE(TM) RECESS 28MM: Type: IMPLANTABLE DEVICE | Site: HUMERUS | Status: NON-FUNCTIONAL

## (undated) DEVICE — BASIC SINGLE BASIN-LF: Brand: MEDLINE INDUSTRIES, INC.